# Patient Record
Sex: FEMALE | Race: OTHER
[De-identification: names, ages, dates, MRNs, and addresses within clinical notes are randomized per-mention and may not be internally consistent; named-entity substitution may affect disease eponyms.]

---

## 2017-03-11 NOTE — EDM.PDOC
ED HPI GI/ABDOMINAL





- General


Chief Complaint: Abdominal Pain


Stated Complaint: LOWER ABDOMINAL PAIN


Time Seen by Provider: 03/11/17 16:26


Source of Information: Reports: Patient, Old records


History Limitations: Reports: No limitations





- History of Present Illness


INITIAL COMMENTS - FREE TEXT/NARRATIVE: 





Patient presents for evaluation and treatment of lower abdominal, flank and low 

back pain. Patient's has been struggling with endometriosis since 2014. She has 

had a partial hysterectomy. She is scheduled to have an oophorectomy on Monday 

by Dr. Dixon in [place]. Patient reports since December her pain has been 

steadily worsening. She states that the pain began to worsen yesterday morning. 

She states that it starts in her back and is a sharp cramping sensation. She 

states that it goes around her flanks and into her lower abdomen. She reports 

associated symptoms of decreased appetite, nausea and chills. Patient reports a 

pressure in her lower abdomen. She denies any fevers, vomiting or dysuria. 

Patient states that she took some tramadol prior to arrival in the ER. 





She had a preop done yesterday in the clinic. She did not mention the pain to 

her PCP yesterday. 


Timing/Duration: Reports: Day(s): (2)


Quality: Reports: cramping


Treatments PTA: Reports: Other medication(s) (tramadol)





- Related Data


Allergies/ADRs: 


 Allergies











Allergy/AdvReac Type Severity Reaction Status Date / Time


 


codeine Allergy  Rash Verified 12/28/16 11:55


 


morphine Allergy  Hives Verified 12/28/16 11:55


 


ketorolac tromethamine AdvReac  Not good Verified 12/28/16 11:55





[From Toradol]   for her  





   kidneys  


 


lorazepam [From Ativan] AdvReac  Hallucinati Verified 12/31/16 11:24





   ons  


 


metoclopramide HCl AdvReac  Irritabilit Verified 12/28/16 11:55





[From Reglan]   y  











Home Meds: 


 Home Meds





Ondansetron [Zofran ODT] 4 mg PO Q8H PRN 12/28/16 [History]


Acetaminophen/oxyCODONE [Percocet 325-5 MG] 1 tab PO Q6H #10 tablet 03/11/17 [Rx

]


Promethazine [Phenergan] 25 mg PO Q6H #12 tablet 03/11/17 [Rx]


traMADol [Ultram] 200 mg PO BID PRN 03/11/17 [History]











Past Medical History





- Past Health History


Medical/Surgical History: Denies Medical/Surgical History


HEENT History: Reports: Impaired vision


Other HEENT History: glasses and contacts


Gastrointestinal History: Reports: Hemorrhoids, Other (see below)


Other Gastrointestinal History: Rectal fissures


Genitourinary History: Reports: Pyelonephritis, UTI, recurrent


Other Genitourinary History: endometreosis


OB/GYN History: Reports: Endometriosis, Pregnancy


Neurological History: Reports: Migraines


Hematologic History: Reports: Anemia


Oncologic (Cancer) History: Reports: Uterine


Dermatologic History: Reports: Eczema





- Past Surgical History


GI Surgical History: Reports: Other (see below)


Other GI Surgeries/Procedures: Rectal fissure surgery


Female  Surgical History: Reports: Hysterectomy


Other Female  Surgeries/Procedures: Studies done on kidneys, bladder, and 

ureter.   States has had partial hyst.





Social & Family History





- Family History


Family Medical History: Noncontributory





- Tobacco Use


Smoking Status *Q: Never Smoker


Years of Tobacco use: 5


Used Tobacco, but Quit: Yes


Month Tobacco Last Used: quit in 2012


Second Hand Smoke Exposure: No





- Caffeine Use


Caffeine Use: Reports: Coffee





- Alcohol Use


Days Per Week of Alcohol Use: 0





- Recreational Drug Use


Recreational Drug Use: No


Drug Use in Last 12 Months: No





- Living Situation & Occupation


Occupation: unemployed





ED ROS GENERAL





- Review of Systems


Review Of Systems: See Below


Constitutional: Reports: chills.  Denies: fever


GI/Abdominal: Reports: Abdominal pain (lower), Nausea.  Denies: Constipation, 

Diarrhea, Vomiting


: Reports: flank pain (bilateral)


Musculoskeletal: Reports: back pain (lower bilateral)





ED EXAM, GI/ABD





- Physical Exam


Exam: See Below


Exam Limited By: No limitations


General Appearance: alert, WD/WN, no apparent distress


Respiratory/Chest: no respiratory distress, lungs clear, normal breath sounds


Cardiovascular: normal peripheral pulses, regular rate, rhythm, no murmur


GI/Abdominal: normal bowel sounds, soft, tenderness (lower abdomen)


Back Exam: normal inspection.  No: CVA tenderness (L), CVA tenderness (R)


Neurological: alert, oriented, normal cognition


Psychiatric: normal affect, normal mood


Skin Exam: Warm, Dry, Normal color





Course





- Vital Signs


Last Recorded V/S: 


 Last Vital Signs











Temp  36.5 C   03/11/17 18:12


 


Pulse  75   03/11/17 18:12


 


Resp  14   03/11/17 18:12


 


BP  104/69   03/11/17 18:12


 


Pulse Ox  99   03/11/17 18:12














- Orders/Labs/Meds


Labs: 


 Laboratory Tests











  03/11/17 03/11/17 03/11/17 Range/Units





  16:24 16:24 16:30 


 


WBC  9.61    (3.98-10.04)  K/mm3


 


RBC  4.79    (3.98-5.22)  M/mm3


 


Hgb  14.6    (11.2-15.7)  gm/L


 


Hct  43.2    (34.1-44.9)  %


 


MCV  90.2    (79.4-94.8)  fl


 


MCH  30.5    (25.6-32.2)  pg


 


MCHC  33.8    (32.2-35.5)  g/dl


 


RDW Std Deviation  40.1    (36.4-46.3)  fL


 


Plt Count  319    (182-369)  K/mm3


 


MPV  10.4    (9.4-12.3)  fl


 


Neutrophils % (Manual)  56    (40-60)  %


 


Band Neutrophils %  1    (0-10)  %


 


Lymphocytes % (Manual)  24    (20-40)  %


 


Atypical Lymphs %  0    %


 


Monocytes % (Manual)  2    (2-10)  %


 


Eosinophils % (Manual)  14 H    (0.7-5.8)  %


 


Basophils % (Manual)  3 H    (0.1-1.2)  


 


Platelet Estimate  Adequate    


 


RBC Morph Comment  Normal    


 


Sodium   139   (136-145)  mEq/L


 


Potassium   4.0   (3.5-5.1)  mEq/L


 


Chloride   103   ()  mEq/L


 


Carbon Dioxide   26   (21-32)  mEq/L


 


Anion Gap   14.0   (5-15)  


 


BUN   12   (7-18)  mg/dL


 


Creatinine   0.8   (0.55-1.02)  mg/dL


 


Est Cr Clr Drug Dosing   86.60   mL/min


 


Estimated GFR (MDRD)   > 60   (>60)  mL/min


 


BUN/Creatinine Ratio   15.0   (14-18)  


 


Glucose   86   ()  mg/dL


 


Calcium   9.4   (8.5-10.1)  mg/dL


 


C-Reactive Protein   0.6   (<1.0)  mg/dL


 


Urine Color    Dark yellow  (Yellow)  


 


Urine Appearance    Cloudy H  (Clear)  


 


Urine pH    6.0  (5.0-8.0)  


 


Ur Specific Gravity    > or = 1.030  (1.005-1.030)  


 


Urine Protein    Negative  (Negative)  


 


Urine Glucose (UA)    Negative  (Negative)  


 


Urine Ketones    Negative  (Negative)  


 


Urine Occult Blood    2+ H  (Negative)  


 


Urine Nitrite    Negative  (Negative)  


 


Urine Bilirubin    Negative  (Negative)  


 


Urine Urobilinogen    0.2  (0.2-1.0)  


 


Ur Leukocyte Esterase    Negative  (Negative)  


 


Urine RBC    0-5  (0-5)  /hpf


 


Urine WBC    0-5  (0-5)  /hpf


 


Ur Epithelial Cells    0-5  (0-5)  /hpf


 


Urine Bacteria    Rare  (FEW)  /hpf


 


Urine Mucus    Few  (FEW)  /hpf











Meds: 


Medications














Discontinued Medications














Generic Name Dose Route Start Last Admin





  Trade Name Freq  PRN Reason Stop Dose Admin


 


Hydromorphone HCl  1 mg  03/11/17 16:49  03/11/17 17:01





  Dilaudid  IVPUSH  03/11/17 16:50  1 mg





  ONETIME ONE   Administration


 


Hydromorphone HCl  1 mg  03/11/17 17:42  03/11/17 17:47





  Dilaudid  IVPUSH  03/11/17 17:43  1 mg





  ONETIME ONE   Administration


 


Ondansetron HCl  4 mg  03/11/17 16:49  03/11/17 16:56





  Zofran  IVPUSH  03/11/17 16:50  4 mg





  ONETIME ONE   Administration


 


Sodium Chloride  10 ml  03/11/17 16:49  03/11/17 17:10





  Saline Flush  FLUSH   10 ml





  ASDIRECTED PRN   Administration





  Keep Vein Open   














- Re-Assessments/Exams


Free Text/Narrative Re-Assessment/Exam: 





03/11/17 17:49


Labs returned. 


White blood cell count is 9.61, hemoglobin is 14.6 and platelets are 319.


UA is 2+ blood, negative for nitrates and leukocytes.


CRPs within normal limits at 0.6.


Sodium is 139 contrast is 4.0 chloride is 13. Anion gap is 13.0.





Given her normal lab results I feel that imaging is unnecessary. Her pain is 

similar to endometriosis in the past. Her only concern today is that is more 

sharp than normal but she does have similar pain. She has also had 2+ blood in 

her urine in the past making kidney stones less likely. 





Patient reports pain continues to be a 9 out of 10. Will give an additional 1mg 

IV dilaudid. 





Will discharge home. I feel that her pain is most likely related to her 

endometriosis. 





Discharge instructions as documented. 











Departure





- Departure


Time of Disposition: 17:59


Disposition: Home, Self-Care 01


Condition: fair


Clinical Impression: 


 Endometriosis, Nausea





Prescriptions: 


Acetaminophen/oxyCODONE [Percocet 325-5 MG] 1 tab PO Q6H #10 tablet


Promethazine [Phenergan] 25 mg PO Q6H #12 tablet


Instructions:  Nausea, Adult, Endometriosis


Referrals: 


PCP,None [Ordering Only Provider] - 


Forms:  ED Department Discharge


Additional Instructions: 


You were given medication in the ER that can affect your ability to drive and 

operate machinery. No driving or operating machinery within 12 hours of taking 

prescription narcotic pain medication.





Continue with your current plan of care.





Percocet one tab every 4-6 hours as needed for severe pain. No driving 

operating machinery within 12 hours of taking prescription narcotic pain 

medication. I recommend take as few of the Percocet as needed control your pain 

as percocet can be habit forming.





Phenergan one tab every 6 hours as needed for nausea.





Please return to the ER should your symptoms change or worsen.

## 2017-10-23 NOTE — CT
CT abdomen and pelvis

 

Technique: Multiple axial sections were obtained from the top of the 

liver inferiorly through the pubic symphysis.  Intravenous and oral 

contrast was not utilized.  Study has been performed as a ureteral 

stone protocol.

 

Comparison: Previous CT abdomen and pelvis exam of 12/30/16 is 

available.

 

Findings: Mild right sided hydronephrosis is seen.  There is a faint 

calcification being seen projected within the distal right ureter at 

the UVJ measuring 2 mm or less presumably causing the right-sided 

hydronephrosis and due to a very minimal obstructing ureteral stone.  

No other abnormal calcifications are seen within the kidneys or along 

the course of the ureters.

 

Small portion of the visualized lung bases appear clear.  Visualized 

liver shows a normal noncontrast CT appearance.  Spleen appears within

 normal limits.  Adrenal glands show no nodule.  No discrete 

abnormality appreciated within the pancreas.  Gallbladder shows no 

calcified gallstones.  Aorta shows no aneurysmal dilatation.  No 

retroperitoneal adenopathy or mesenteric abnormalities are seen.  

Appendix is seen which is normal.  No pelvic mass or adenopathy is 

identified.  No free fluid is seen.  No inflammatory change is noted.

 

Bone window settings were reviewed which appear within normal limits 

for the patient's age.

 

Impression:

1.  Very faint 2 mm or smaller calcification projected within the 

distal right ureter at the UVJ.  This is felt to be a very small 

obstructing stone causing mild right sided hydronephrosis.

2.  Other normal findings as described above seen on noncontrast study

 performed as a ureteral stone protocol.

 

Diagnostic code #3

 

Agree with preliminary report issued by [employer] ([facility] 

preliminary report dictated on 10/23/17, 1:11 AM Central Time)

## 2017-10-23 NOTE — EDM.PDOC
ED HPI GENERAL MEDICAL PROBLEM





- General


Chief Complaint: Flank Pain


Stated Complaint: TROUBLE URINATING


Time Seen by Provider: 10/22/17 23:12


Source of Information: Reports: Patient


History Limitations: Reports: No Limitations





- History of Present Illness


INITIAL COMMENTS - FREE TEXT/NARRATIVE: 





The patient presents with bilateral flank pain with the right being worse then 

the left.  She has nausea but no vomiting.  She has no fever or chills.  She is 

having trouble urinating.  It is hard to go.  She dose not have any dysuria or 

hematuria.  She has a history of endometriosis and hystorectomy.  She also has 

a history of kidney stones.


Onset: Gradual


Duration: Day(s):


Location: Reports: Abdomen, Back


Quality: Reports: Sharp


Severity: Severe


Improves with: Reports: None


Worsens with: Reports: None


Associated Symptoms: Reports: Nausea/Vomiting.  Denies: Chest Pain, Cough, Fever

/Chills, Shortness of Breath


  ** Bilateral Flank


Pain Score (Numeric/FACES): 8





- Related Data


 Allergies











Allergy/AdvReac Type Severity Reaction Status Date / Time


 


codeine Allergy  Rash Verified 08/29/17 18:57


 


morphine Allergy  Hives Verified 08/29/17 18:57


 


ketorolac tromethamine AdvReac  Not good Verified 08/29/17 18:57





[From Toradol]   for her  





   kidneys  


 


lorazepam [From Ativan] AdvReac  Hallucinati Verified 08/29/17 18:57





   ons  


 


metoclopramide HCl AdvReac  Irritabilit Verified 08/29/17 18:57





[From Reglan]   y  











Home Meds: 


 Home Meds





Tamsulosin HCl [Flomax] 0.4 mg PO DAILY #7 cap.er.24h 10/23/17 [Rx]


oxyCODONE HCl/Acetaminophen [Percocet 5-325 mg Tablet] 1 - 2 each PO Q6HR PRN #

20 tablet 10/23/17 [Rx]











Past Medical History





- Past Health History


Medical/Surgical History: Denies Medical/Surgical History


HEENT History: Reports: Impaired Vision


Other HEENT History: glasses and contacts


Gastrointestinal History: Reports: Hemorrhoids


Other Gastrointestinal History: Rectal fissures


Genitourinary History: Reports: Pyelonephritis, UTI, Recurrent


Other Genitourinary History: endometreosis, kidney stones


OB/GYN History: Reports: Endometriosis, Pregnancy


Neurological History: Reports: Migraines


Psychiatric History: Reports: Anxiety


Hematologic History: Reports: Anemia


Oncologic (Cancer) History: Reports: Uterine


Dermatologic History: Reports: Eczema





- Past Surgical History


GI Surgical History: Reports: Other (See Below)


Female  Surgical History: Reports: Kidney stone extraction, Ureteral Stent





Social & Family History





- Family History


Family Medical History: Noncontributory





- Tobacco Use


Smoking Status *Q: Former Smoker


Years of Tobacco use: 5


Used Tobacco, but Quit: Yes


Month Tobacco Last Used: 2010


Second Hand Smoke Exposure: No





- Caffeine Use


Caffeine Use: Reports: None





- Alcohol Use


Days Per Week of Alcohol Use: 0





- Recreational Drug Use


Recreational Drug Use: No


Drug Use in Last 12 Months: No





- Living Situation & Occupation


Occupation: Unemployed





ED ROS GENERAL





- Review of Systems


Review Of Systems: See Below


Constitutional: Reports: No Symptoms


HEENT: Reports: No Symptoms


Respiratory: Reports: No Symptoms


Cardiovascular: Reports: No Symptoms


Endocrine: Reports: No Symptoms


GI/Abdominal: Reports: Abdominal Pain, Nausea.  Denies: Vomiting


: Reports: Other (Having trouble urinating)


Musculoskeletal: Reports: Back Pain





ED EXAM, GI/ABD





- Physical Exam


Exam: See Below


Exam Limited By: No Limitations


General Appearance: Alert, No Apparent Distress


Ears: Normal External Exam


Nose: Normal Inspection


Head: Atraumatic, Normocephalic


Neck: Normal Inspection


Respiratory/Chest: No Respiratory Distress, Lungs Clear, Normal Breath Sounds


Cardiovascular: Regular Rate, Rhythm, No Edema, No Murmur


GI/Abdominal Exam: Soft, Non-Tender, No Organomegaly, No Mass


Back Exam: CVA Tenderness (L), CVA Tenderness (R)


Extremities: Normal Inspection





Course





- Vital Signs


Last Recorded V/S: 





 Last Vital Signs











Temp  98.8 F   10/22/17 22:46


 


Pulse  87   10/22/17 22:46


 


Resp  20   10/22/17 22:46


 


BP  127/98 H  10/22/17 22:46


 


Pulse Ox  97   10/22/17 22:46














- Orders/Labs/Meds


Orders: 





 Active Orders 24 hr











 Category Date Time Status


 


 Peripheral IV Care [RC] .AS DIRECTED Care  10/22/17 23:23 Active


 


 Abdomen Pelvis wo Cont [CT] Stat Exams  10/22/17 23:22 Taken


 


 Sodium Chloride 0.9% [Normal Saline] 1,000 ml Med  10/22/17 23:30 Active





 IV ASDIRECTED   


 


 Sodium Chloride 0.9% [Saline Flush] Med  10/22/17 23:22 Active





 10 ml FLUSH ASDIRECTED PRN   


 


 ED Antiemetic Medication Reflex [OM.PC] Stat Oth  10/22/17 23:22 Ordered


 


 Peripheral IV Insertion Adult [OM.PC] Stat Oth  10/22/17 23:22 Ordered








 Medication Orders





Sodium Chloride (Normal Saline)  1,000 mls @ 125 mls/hr IV ASDIRECTED BRIA


   Last Admin: 10/22/17 23:40  Dose: 125 mls/hr


Sodium Chloride (Saline Flush)  10 ml FLUSH ASDIRECTED PRN


   PRN Reason: Keep Vein Open


   Last Admin: 10/22/17 23:53  Dose: 10 ml








Labs: 





 Laboratory Tests











  10/22/17 10/23/17 10/23/17 Range/Units





  23:37 00:52 00:52 


 


WBC   9.19   (3.98-10.04)  K/mm3


 


RBC   4.82   (3.98-5.22)  M/mm3


 


Hgb   14.9   (11.2-15.7)  gm/L


 


Hct   43.7   (34.1-44.9)  %


 


MCV   90.7   (79.4-94.8)  fl


 


MCH   30.9   (25.6-32.2)  pg


 


MCHC   34.1   (32.2-35.5)  g/dl


 


RDW Std Deviation   40.3   (36.4-46.3)  fL


 


Plt Count   289   (182-369)  K/mm3


 


MPV   10.2   (9.4-12.3)  fl


 


Neut % (Auto)   59.7   (34.0-71.1)  %


 


Lymph % (Auto)   30.1   (19.3-51.7)  %


 


Mono % (Auto)   8.2   (4.7-12.5)  %


 


Eos % (Auto)   1.6   (0.7-5.8)  


 


Baso % (Auto)   0.2   (0.1-1.2)  %


 


Neut # (Auto)   5.48   (1.56-6.13)  K/mm3


 


Lymph # (Auto)   2.77   (1.18-3.74)  K/mm3


 


Mono # (Auto)   0.75 H   (0.24-0.36)  K/mm3


 


Eos # (Auto)   0.15   (0.04-0.36)  K/mm3


 


Baso # (Auto)   0.02   (0.01-0.08)  K/mm3


 


Sodium    140  (136-145)  mEq/L


 


Potassium    3.6  (3.5-5.1)  mEq/L


 


Chloride    104  ()  mEq/L


 


Carbon Dioxide    24  (21-32)  mEq/L


 


Anion Gap    15.6 H  (5-15)  


 


BUN    15  (7-18)  mg/dL


 


Creatinine    0.8  (0.55-1.02)  mg/dL


 


Est Cr Clr Drug Dosing    85.83  mL/min


 


Estimated GFR (MDRD)    > 60  (>60)  mL/min


 


BUN/Creatinine Ratio    18.8 H  (14-18)  


 


Glucose    87  ()  mg/dL


 


Calcium    9.9  (8.5-10.1)  mg/dL


 


Total Bilirubin    0.3  (0.2-1.0)  mg/dL


 


AST    19  (15-37)  U/L


 


ALT    22  (14-59)  U/L


 


Alkaline Phosphatase    70  ()  U/L


 


Total Protein    8.4 H  (6.4-8.2)  g/dl


 


Albumin    4.2  (3.4-5.0)  g/dl


 


Globulin    4.2  gm/dL


 


Albumin/Globulin Ratio    1.0  (1-2)  


 


Lipase    172  ()  U/L


 


Urine Color  Yellow    (Yellow)  


 


Urine Appearance  Slt cloudy H    (Clear)  


 


Urine pH  7.0    (5.0-8.0)  


 


Ur Specific Gravity  1.020    (1.005-1.030)  


 


Urine Protein  Negative    (Negative)  


 


Urine Glucose (UA)  Negative    (Negative)  


 


Urine Ketones  Negative    (Negative)  


 


Urine Occult Blood  Negative    (Negative)  


 


Urine Nitrite  Negative    (Negative)  


 


Urine Bilirubin  Negative    (Negative)  


 


Urine Urobilinogen  0.2    (0.2-1.0)  


 


Ur Leukocyte Esterase  Negative    (Negative)  


 


Urine RBC  0-5    (0-5)  /hpf


 


Urine WBC  0-5    (0-5)  /hpf


 


Ur Epithelial Cells  10-20 H    (0-5)  /hpf


 


Urine Bacteria  Few    (FEW)  /hpf


 


Urine Mucus  Few    (FEW)  /hpf











Meds: 





Medications











Generic Name Dose Route Start Last Admin





  Trade Name Freq  PRN Reason Stop Dose Admin


 


Sodium Chloride  1,000 mls @ 125 mls/hr  10/22/17 23:30  10/22/17 23:40





  Normal Saline  IV   125 mls/hr





  ASDIRECTED BRIA   Administration


 


Sodium Chloride  10 ml  10/22/17 23:22  10/22/17 23:53





  Saline Flush  FLUSH   10 ml





  ASDIRECTED PRN   Administration





  Keep Vein Open   














Discontinued Medications














Generic Name Dose Route Start Last Admin





  Trade Name Toddq  PRN Reason Stop Dose Admin


 


Diphenhydramine HCl  50 mg  10/23/17 00:25  10/23/17 00:29





  Benadryl  IVPUSH  10/23/17 00:26  50 mg





  ONETIME ONE   Administration


 


Diphenhydramine HCl  25 mg  10/23/17 01:15  





  Benadryl  IVPUSH  10/23/17 01:16  





  ONETIME ONE   


 


Hydromorphone HCl  1 mg  10/22/17 23:23  10/22/17 23:41





  Dilaudid  IVPUSH  10/22/17 23:24  1 mg





  ONETIME ONE   Administration


 


Hydromorphone HCl  1 mg  10/23/17 01:15  





  Dilaudid  IVPUSH  10/23/17 01:16  





  ONETIME ONE   


 


Ondansetron HCl  4 mg  10/22/17 23:22  10/22/17 23:41





  Zofran  IVPUSH  10/22/17 23:23  4 mg





  ONETIME ONE   Administration


 


Ondansetron HCl  4 mg  10/23/17 01:15  





  Zofran  IVPUSH  10/23/17 01:16  





  ONETIME ONE   














- Re-Assessments/Exams


Free Text/Narrative Re-Assessment/Exam: 





10/23/17 01:24


I ordered an IV NS at 125mL/hr, zofran 4mg IV, dilaudid 1mg IV, labs, UA and a 

CT of her abdomen and pelvis.  Her CBC and CMP look good.  Her UA show no UTI.  

Her CT shows mild right sided hydronephrosis.  This could be from a recently 

passed stone or potentially there is a very faintly calcified 2mm right UVJ 

sone present.  She had some itching with the zofran so I ordered benadryl.  She 

is having more and nausea so I ordered dilaudid, zofran and benadryl.  She has 

a kidney stone so I will give her prescriptions for percocet and flomax.





Departure





- Departure


Time of Disposition: 01:35


Disposition: Home, Self-Care 01


Condition: Good


Clinical Impression: 


 Kidney stone, Ureteral calculus, right








- Discharge Information


Prescriptions: 


oxyCODONE HCl/Acetaminophen [Percocet 5-325 mg Tablet] 1 - 2 each PO Q6HR PRN #

20 tablet


 PRN Reason: Pain


Tamsulosin HCl [Flomax] 0.4 mg PO DAILY #7 cap.er.24h


Referrals: 


Donavon De León [Primary Care Provider] - 1 Week


Additional Instructions: 


Take the percocet for pain and take flomax daily.  Drink plenty of fluids.  

Follow up with your doctor and please return if you are worse.





- My Orders


Last 24 Hours: 





My Active Orders





10/22/17 23:22


Abdomen Pelvis wo Cont [CT] Stat 


Sodium Chloride 0.9% [Saline Flush]   10 ml FLUSH ASDIRECTED PRN 


ED Antiemetic Medication Reflex [OM.PC] Stat 


Peripheral IV Insertion Adult [OM.PC] Stat 





10/22/17 23:23


Peripheral IV Care [RC] .AS DIRECTED 





10/22/17 23:30


Sodium Chloride 0.9% [Normal Saline] 1,000 ml IV ASDIRECTED 














- Assessment/Plan


Last 24 Hours: 





My Active Orders





10/22/17 23:22


Abdomen Pelvis wo Cont [CT] Stat 


Sodium Chloride 0.9% [Saline Flush]   10 ml FLUSH ASDIRECTED PRN 


ED Antiemetic Medication Reflex [OM.PC] Stat 


Peripheral IV Insertion Adult [OM.PC] Stat 





10/22/17 23:23


Peripheral IV Care [RC] .AS DIRECTED 





10/22/17 23:30


Sodium Chloride 0.9% [Normal Saline] 1,000 ml IV ASDIRECTED

## 2017-10-28 NOTE — EDM.PDOC
ED HPI GENERAL MEDICAL PROBLEM





- General


Chief Complaint: Flank Pain


Stated Complaint: RIGHT FLANK PAIN


Time Seen by Provider: 10/27/17 23:55


Source of Information: Reports: Patient


History Limitations: Reports: No Limitations





- History of Present Illness


INITIAL COMMENTS - FREE TEXT/NARRATIVE: 





This is a 27-year-old  female. She was here on Sunday was diagnosed 

with a 2 mm stone in the distal right ureter with some mild hydronephrosis. She 

states she's been straining her urine since that time has not followed up with 

her family doctor and then today apparently the right flank and lower quadrant 

abdominal pain seemed to get worse. Now she says is very sharp pain and she is 

nauseated and she comes back to the ER for evaluation. She's had no actual 

vomiting at this time. She states today she noted a couple of drops of blood in 

her urine when she urinated. She has not passed the stone onto the strainer as 

of yet. No fever no chills. No other acute symptoms.


  ** Right Flank


Pain Score (Numeric/FACES): 9





- Related Data


 Allergies











Allergy/AdvReac Type Severity Reaction Status Date / Time


 


codeine Allergy  Rash Verified 10/28/17 00:08


 


morphine Allergy  Hives Verified 10/28/17 00:08


 


ketorolac tromethamine AdvReac  Not good Verified 10/28/17 00:08





[From Toradol]   for her  





   kidneys  


 


lorazepam [From Ativan] AdvReac  Hallucinati Verified 10/28/17 00:08





   ons  


 


metoclopramide HCl AdvReac  Irritabilit Verified 10/28/17 00:08





[From Reglan]   y  











Home Meds: 


 Home Meds





Tamsulosin HCl [Flomax] 0.4 mg PO DAILY #7 cap.er.24h 10/23/17 [Rx]


oxyCODONE HCl/Acetaminophen [Percocet 5-325 mg Tablet] 1 - 2 each PO Q6HR PRN #

20 tablet 10/23/17 [Rx]


Promethazine [Phenergan] 25 mg PO Q6H PRN #12 tablet 10/28/17 [Rx]


traMADol [Ultram] 50 mg PO Q8H PRN #12 tablet 10/28/17 [Rx]











Past Medical History





- Past Health History


Medical/Surgical History: Denies Medical/Surgical History


HEENT History: Reports: Impaired Vision


Other HEENT History: glasses and contacts


Gastrointestinal History: Reports: Hemorrhoids


Other Gastrointestinal History: Rectal fissures


Genitourinary History: Reports: Pyelonephritis, UTI, Recurrent


Other Genitourinary History: endometreosis, kidney stones


OB/GYN History: Reports: Endometriosis, Pregnancy


Neurological History: Reports: Migraines


Psychiatric History: Reports: Anxiety


Hematologic History: Reports: Anemia


Oncologic (Cancer) History: Reports: Uterine


Dermatologic History: Reports: Eczema





- Past Surgical History


GI Surgical History: Reports: Other (See Below)


Female  Surgical History: Reports: Kidney stone extraction, Ureteral Stent





Social & Family History





- Family History


Family Medical History: Noncontributory





- Tobacco Use


Smoking Status *Q: Former Smoker


Years of Tobacco use: 5


Used Tobacco, but Quit: Yes


Month Tobacco Last Used: 2


Second Hand Smoke Exposure: Yes





- Caffeine Use


Caffeine Use: Reports: None





- Alcohol Use


Days Per Week of Alcohol Use: 0





- Recreational Drug Use


Recreational Drug Use: No


Drug Use in Last 12 Months: No





- Living Situation & Occupation


Occupation: Unemployed





ED ROS GENERAL





- Review of Systems


Review Of Systems: See Below


Constitutional: Denies: Fever, Chills


HEENT: Reports: No Symptoms


Respiratory: Reports: No Symptoms


Cardiovascular: Reports: No Symptoms


Endocrine: Reports: No Symptoms


GI/Abdominal: Reports: Abdominal Pain, Nausea.  Denies: Vomiting


: Reports: Flank Pain, Hematuria.  Denies: Discharge


Musculoskeletal: Reports: No Symptoms


Skin: Reports: No Symptoms


Neurological: Reports: No Symptoms


Psychiatric: Reports: No Symptoms


Hematologic/Lymphatic: Reports: No Symptoms





ED EXAM, GI/ABD





- Physical Exam


Exam: See Below


Exam Limited By: No Limitations


General Appearance: Alert, WD/WN, No Apparent Distress


Eyes: Bilateral: Normal Appearance


Ears: Normal External Exam


Nose: Normal Inspection


Throat/Mouth: Normal Inspection, Normal Lips, Normal Voice, No Airway Compromise


Head: Normocephalic


Neck: Supple


Respiratory/Chest: No Respiratory Distress, Lungs Clear, Normal Breath Sounds


Cardiovascular: Regular Rate, Rhythm, No Murmur


GI/Abdominal Exam: Soft, Other (Mild soreness in the right lower quadrant and 

possibly the flank but no rigidity no masses, no rebound noted)


Back Exam: Full Range of Motion


Extremities: Normal Inspection, Normal Range of Motion, Non-Tender


Neurological: Alert, Oriented


Psychiatric: Normal Affect, Normal Mood


Skin Exam: Warm, Dry





Course





- Vital Signs


Last Recorded V/S: 


 Last Vital Signs











Temp  98.3 F   10/27/17 23:52


 


Pulse  110 H  10/27/17 23:52


 


Resp  18   10/27/17 23:52


 


BP  143/93 H  10/27/17 23:52


 


Pulse Ox  99   10/27/17 23:52














- Orders/Labs/Meds


Orders: 


 Active Orders 24 hr











 Category Date Time Status


 


 Sodium Chloride 0.9% [Normal Saline] 1,000 ml Med  10/28/17 00:15 Active





 IV ASDIRECTED   








 Medication Orders





Sodium Chloride (Normal Saline)  1,000 mls @ 1,000 mls/hr IV ASDIRECTED BRIA


   Last Admin: 10/28/17 00:59  Dose: 1,000 mls/hr








Labs: 


 Laboratory Tests











  10/28/17 10/28/17 10/28/17 Range/Units





  00:44 00:56 00:56 


 


WBC   7.74   (3.98-10.04)  K/mm3


 


RBC   4.57   (3.98-5.22)  M/mm3


 


Hgb   14.2   (11.2-15.7)  gm/L


 


Hct   41.2   (34.1-44.9)  %


 


MCV   90.2   (79.4-94.8)  fl


 


MCH   31.1   (25.6-32.2)  pg


 


MCHC   34.5   (32.2-35.5)  g/dl


 


RDW Std Deviation   39.7   (36.4-46.3)  fL


 


Plt Count   288   (182-369)  K/mm3


 


MPV   10.1   (9.4-12.3)  fl


 


Neut % (Auto)   59.1   (34.0-71.1)  %


 


Lymph % (Auto)   29.8   (19.3-51.7)  %


 


Mono % (Auto)   8.1   (4.7-12.5)  %


 


Eos % (Auto)   2.5   (0.7-5.8)  


 


Baso % (Auto)   0.4   (0.1-1.2)  %


 


Neut # (Auto)   4.57   (1.56-6.13)  K/mm3


 


Lymph # (Auto)   2.31   (1.18-3.74)  K/mm3


 


Mono # (Auto)   0.63 H   (0.24-0.36)  K/mm3


 


Eos # (Auto)   0.19   (0.04-0.36)  K/mm3


 


Baso # (Auto)   0.03   (0.01-0.08)  K/mm3


 


Sodium    142  (136-145)  mEq/L


 


Potassium    3.6  (3.5-5.1)  mEq/L


 


Chloride    105  ()  mEq/L


 


Carbon Dioxide    26  (21-32)  mEq/L


 


Anion Gap    14.6  (5-15)  


 


BUN    13  (7-18)  mg/dL


 


Creatinine    0.8  (0.55-1.02)  mg/dL


 


Est Cr Clr Drug Dosing    85.83  mL/min


 


Estimated GFR (MDRD)    > 60  (>60)  mL/min


 


BUN/Creatinine Ratio    16.3  (14-18)  


 


Glucose    100  ()  mg/dL


 


Calcium    9.3  (8.5-10.1)  mg/dL


 


Total Bilirubin    0.3  (0.2-1.0)  mg/dL


 


AST    17  (15-37)  U/L


 


ALT    23  (14-59)  U/L


 


Alkaline Phosphatase    66  ()  U/L


 


Total Protein    8.0  (6.4-8.2)  g/dl


 


Albumin    4.1  (3.4-5.0)  g/dl


 


Globulin    3.9  gm/dL


 


Albumin/Globulin Ratio    1.1  (1-2)  


 


Urine Color  Light yellow    (Yellow)  


 


Urine Appearance  Clear    (Clear)  


 


Urine pH  7.0    (5.0-8.0)  


 


Ur Specific Gravity  1.015    (1.005-1.030)  


 


Urine Protein  Negative    (Negative)  


 


Urine Glucose (UA)  Negative    (Negative)  


 


Urine Ketones  Negative    (Negative)  


 


Urine Occult Blood  Negative    (Negative)  


 


Urine Nitrite  Negative    (Negative)  


 


Urine Bilirubin  Negative    (Negative)  


 


Urine Urobilinogen  0.2    (0.2-1.0)  


 


Ur Leukocyte Esterase  Negative    (Negative)  


 


Urine RBC  0-5    (0-5)  /hpf


 


Urine WBC  0-5    (0-5)  /hpf


 


Ur Epithelial Cells  0-5    (0-5)  /hpf


 


Urine Bacteria  Rare    (FEW)  /hpf


 


Urine Mucus  Not seen    (FEW)  /hpf











Meds: 


Medications











Generic Name Dose Route Start Last Admin





  Trade Name Freq  PRN Reason Stop Dose Admin


 


Sodium Chloride  1,000 mls @ 1,000 mls/hr  10/28/17 00:15  10/28/17 00:59





  Normal Saline  IV   1,000 mls/hr





  ASDIRECTED BRIA   Administration














Discontinued Medications














Generic Name Dose Route Start Last Admin





  Trade Name Freq  PRN Reason Stop Dose Admin


 


Diphenhydramine HCl  25 mg  10/28/17 00:13  10/28/17 01:04





  Benadryl  IVPUSH  10/28/17 00:14  25 mg





  ONETIME ONE   Administration


 


Ondansetron HCl  4 mg  10/28/17 00:14  10/28/17 01:03





  Zofran  IVPUSH  10/28/17 00:15  4 mg





  ONETIME ONE   Administration


 


Tramadol HCl  50 mg  10/28/17 02:00  10/28/17 02:06





  Ultram  PO  10/28/17 02:01  50 mg





  ONETIME ONE   Administration














- Re-Assessments/Exams


Free Text/Narrative Re-Assessment/Exam: 





10/28/17 02:11


I spoke to the patient at length regarding her kidney stone. I indicated to her 

that tramadol had worked well in the past for me and she says that works well 

for her. I indicated that if she needed other pain medication she would have to 

get it from Dr. Lopez her primary care physician. She did not appreciate me or 

the nurse because she felt like we were talking down to her about her pain 

medication requests. She had talked to the nurse at least 3 times for pain 

medications but never actually talk to me for pain medications. I explained to 

her know this was not the case and I have passed a kidney stone and I realize 

that they can hurt but she is never expressed any significant need for pain 

medication since she's been here had any exterior signs that she was in severe 

pain needing IV pain medications. She is happy with the tramadol since she has 

taken it in the past and it is worked for her.





Departure





- Departure


Time of Disposition: 02:14


Disposition: Home, Self-Care 01


Clinical Impression: 


 Ureteral stone, Ureteral colic, Nausea








- Discharge Information


Prescriptions: 


Promethazine [Phenergan] 25 mg PO Q6H PRN #12 tablet


 PRN Reason: Nausea


traMADol [Ultram] 50 mg PO Q8H PRN #12 tablet


 PRN Reason: Pain


Instructions:  Kidney Stones, Easy-to-Read


Referrals: 


Donavon De León [Primary Care Provider] - 


Forms:  ED Department Discharge


Additional Instructions: 


Home and drink lots of fluids, continue to strain your urine for the kidney 

stone, take the medicine as needed for nausea and pain, you need to follow-up 

with Dr. Lopez so that if this continues he can refer you to her urologist who 

can remedy this situation if the stone is unwilling to move





- My Orders


Last 24 Hours: 


My Active Orders





10/28/17 00:15


Sodium Chloride 0.9% [Normal Saline] 1,000 ml IV ASDIRECTED 














- Assessment/Plan


Last 24 Hours: 


My Active Orders





10/28/17 00:15


Sodium Chloride 0.9% [Normal Saline] 1,000 ml IV ASDIRECTED

## 2017-11-29 NOTE — EDM.PDOC
ED HPI GENERAL MEDICAL PROBLEM





- General


Chief Complaint: OB/GYN Problem


Stated Complaint: vaginal pain


Time Seen by Provider: 11/29/17 21:26





- History of Present Illness


INITIAL COMMENTS - FREE TEXT/NARRATIVE: 





27-year-old female presents emergency room with pelvic pain.





Patient denies any increased discharge no burning or frequency with urination 

occasionally she has some discomfort with urination. Patient has long history 

of endometriosis. She has follow-up with her gynecologist in [place] on 20 December and the way she describes it they're anticipating oophorectomy she's 

already had a hysterectomy. She's had some nausea this is been worsened by her 

using her tramadol.. Her pain is worse left pelvis irritating the left side of 

the vagina. She has not had any increased discharge.


  ** Lower Pelvic


Pain Score (Numeric/FACES): 9





- Related Data


 Allergies











Allergy/AdvReac Type Severity Reaction Status Date / Time


 


codeine Allergy  Rash Verified 10/28/17 00:08


 


morphine Allergy  Hives Verified 10/28/17 00:08


 


ketorolac tromethamine AdvReac  Not good Verified 10/28/17 00:08





[From Toradol]   for her  





   kidneys  


 


lorazepam [From Ativan] AdvReac  Hallucinati Verified 10/28/17 00:08





   ons  


 


metoclopramide HCl AdvReac  Irritabilit Verified 10/28/17 00:08





[From Reglan]   y  











Home Meds: 


 Home Meds





traMADol [Ultram] 50 mg PO Q8H PRN #12 tablet 10/28/17 [Rx]


ALPRAZolam [Alprazolam] 0.5 mg PO BEDTIME PRN 11/29/17 [History]


ALPRAZolam [Alprazolam] 0.5 mg PO BEDTIME PRN 11/29/17 [History]


metroNIDAZOLE [Flagyl] 500 mg PO Q12H #14 tablet 11/30/17 [Rx]











Past Medical History





- Past Health History


Medical/Surgical History: Denies Medical/Surgical History


HEENT History: Reports: Impaired Vision


Other HEENT History: glasses and contacts


Gastrointestinal History: Reports: Hemorrhoids


Other Gastrointestinal History: Rectal fissures


Genitourinary History: Reports: Pyelonephritis, UTI, Recurrent


Other Genitourinary History: endometreosis, kidney stones


OB/GYN History: Reports: Endometriosis, Pregnancy


Neurological History: Reports: Migraines


Psychiatric History: Reports: Anxiety


Hematologic History: Reports: Anemia


Oncologic (Cancer) History: Reports: Uterine


Dermatologic History: Reports: Eczema





- Past Surgical History


Female  Surgical History: Reports: Hysterectomy, Kidney stone extraction, 

Ureteral Stent





Social & Family History





- Family History


Family Medical History: Noncontributory





- Tobacco Use


Smoking Status *Q: Never Smoker


Years of Tobacco use: 5


Used Tobacco, but Quit: Yes


Month Tobacco Last Used: 2


Second Hand Smoke Exposure: Yes





- Caffeine Use


Caffeine Use: Reports: None





- Alcohol Use


Days Per Week of Alcohol Use: 0





- Recreational Drug Use


Recreational Drug Use: No


Drug Use in Last 12 Months: No





- Living Situation & Occupation


Occupation: Unemployed





ED ROS GENERAL





- Review of Systems


Review Of Systems: See Below


Constitutional: Reports: Chills.  Denies: Fever, Night Sweats, Diaphoresis


HEENT: Reports: No Symptoms


Respiratory: Reports: No Symptoms


Cardiovascular: Reports: No Symptoms


GI/Abdominal: Reports: Nausea.  Denies: Constipation, Diarrhea, Vomiting


: Reports: Discharge (More discharge than normal but not an abnormal discharge

)


Musculoskeletal: Reports: No Symptoms


Skin: Reports: No Symptoms


Neurological: Reports: No Symptoms


Psychiatric: Reports: No Symptoms





ED EXAM, GENERAL





- Physical Exam


Exam: See Below


Exam Limited By: No Limitations


General Appearance: Alert, No Apparent Distress


Head: Atraumatic, Normocephalic


Neck: Normal Inspection, Supple, Non-Tender, Full Range of Motion.  No: 

Lymphadenopathy (R)


Respiratory/Chest: No Respiratory Distress, Lungs Clear, Normal Breath Sounds


Cardiovascular: Regular Rate, Rhythm, No Edema, No Murmur


GI/Abdominal: Normal Bowel Sounds, Soft, Other (She has some left suprapubic 

discomfort more so than the right she has right sided suprapubic discomfort no 

distention guarding rigidity or rebound noted)


 (Female) Exam: Normal External Exam, Normal Speculum Exam, Other (Cervix 

surgically absent bimanual examination reveals tenderness in the pelvis more so 

than the right)


Back Exam: Normal Inspection.  No: CVA Tenderness (L), CVA Tenderness (R)


Extremities: Normal Inspection, No Pedal Edema


Neurological: Alert, Oriented, Normal Cognition


Psychiatric: Normal Affect





Course





- Vital Signs


Last Recorded V/S: 


 Last Vital Signs











Temp  36.8 C   11/29/17 21:01


 


Pulse  96   11/29/17 21:01


 


Resp  16   11/29/17 21:01


 


BP  121/90   11/29/17 21:01


 


Pulse Ox  96   11/29/17 21:01














- Orders/Labs/Meds


Labs: 


 Laboratory Tests











  11/29/17 11/29/17 11/29/17 Range/Units





  21:55 21:55 22:02 


 


WBC  8.47    (3.98-10.04)  K/mm3


 


RBC  5.07    (3.98-5.22)  M/mm3


 


Hgb  15.7    (11.2-15.7)  gm/L


 


Hct  45.3 H    (34.1-44.9)  %


 


MCV  89.3    (79.4-94.8)  fl


 


MCH  31.0    (25.6-32.2)  pg


 


MCHC  34.7    (32.2-35.5)  g/dl


 


RDW Std Deviation  39.2    (36.4-46.3)  fL


 


Plt Count  249    (182-369)  K/mm3


 


MPV  10.5    (9.4-12.3)  fl


 


Neutrophils % (Manual)  66 H    (40-60)  %


 


Band Neutrophils %  0    (0-10)  %


 


Lymphocytes % (Manual)  23    (20-40)  %


 


Atypical Lymphs %  0    %


 


Monocytes % (Manual)  6    (2-10)  %


 


Eosinophils % (Manual)  5    (0.7-5.8)  %


 


Basophils % (Manual)  0 L    (0.1-1.2)  


 


Platelet Estimate  Adequate    


 


Plt Morphology Comment  Normal    


 


RBC Morph Comment  Normal    


 


Sodium   141   (136-145)  mEq/L


 


Potassium   3.7   (3.5-5.1)  mEq/L


 


Chloride   104   ()  mEq/L


 


Carbon Dioxide   26   (21-32)  mEq/L


 


Anion Gap   14.7   (5-15)  


 


BUN   10   (7-18)  mg/dL


 


Creatinine   0.8   (0.55-1.02)  mg/dL


 


Est Cr Clr Drug Dosing   89.60   mL/min


 


Estimated GFR (MDRD)   > 60   (>60)  mL/min


 


BUN/Creatinine Ratio   12.5 L   (14-18)  


 


Glucose   85   ()  mg/dL


 


Calcium   9.6   (8.5-10.1)  mg/dL


 


Total Bilirubin   0.3   (0.2-1.0)  mg/dL


 


AST   16   (15-37)  U/L


 


ALT   21   (14-59)  U/L


 


Alkaline Phosphatase   74   ()  U/L


 


Total Protein   8.0   (6.4-8.2)  g/dl


 


Albumin   4.0   (3.4-5.0)  g/dl


 


Globulin   4.0   gm/dL


 


Albumin/Globulin Ratio   1.0   (1-2)  


 


Urine Color     (Yellow)  


 


Urine Appearance     (Clear)  


 


Urine pH     (5.0-8.0)  


 


Ur Specific Gravity     (1.005-1.030)  


 


Urine Protein     (Negative)  


 


Urine Glucose (UA)     (Negative)  


 


Urine Ketones     (Negative)  


 


Urine Occult Blood     (Negative)  


 


Urine Nitrite     (Negative)  


 


Urine Bilirubin     (Negative)  


 


Urine Urobilinogen     (0.2-1.0)  


 


Ur Leukocyte Esterase     (Negative)  


 


Urine RBC     (0-5)  /hpf


 


Urine WBC     (0-5)  /hpf


 


Ur Epithelial Cells     (0-5)  /hpf


 


Urine Bacteria     (FEW)  /hpf


 


Urine Mucus     (FEW)  /hpf


 


C trachomatis DNA (PCR)    Not detected  


 


N gonorrhoeae DNA (PCR)    Not detected  














  11/29/17 Range/Units





  22:02 


 


WBC   (3.98-10.04)  K/mm3


 


RBC   (3.98-5.22)  M/mm3


 


Hgb   (11.2-15.7)  gm/L


 


Hct   (34.1-44.9)  %


 


MCV   (79.4-94.8)  fl


 


MCH   (25.6-32.2)  pg


 


MCHC   (32.2-35.5)  g/dl


 


RDW Std Deviation   (36.4-46.3)  fL


 


Plt Count   (182-369)  K/mm3


 


MPV   (9.4-12.3)  fl


 


Neutrophils % (Manual)   (40-60)  %


 


Band Neutrophils %   (0-10)  %


 


Lymphocytes % (Manual)   (20-40)  %


 


Atypical Lymphs %   %


 


Monocytes % (Manual)   (2-10)  %


 


Eosinophils % (Manual)   (0.7-5.8)  %


 


Basophils % (Manual)   (0.1-1.2)  


 


Platelet Estimate   


 


Plt Morphology Comment   


 


RBC Morph Comment   


 


Sodium   (136-145)  mEq/L


 


Potassium   (3.5-5.1)  mEq/L


 


Chloride   ()  mEq/L


 


Carbon Dioxide   (21-32)  mEq/L


 


Anion Gap   (5-15)  


 


BUN   (7-18)  mg/dL


 


Creatinine   (0.55-1.02)  mg/dL


 


Est Cr Clr Drug Dosing   mL/min


 


Estimated GFR (MDRD)   (>60)  mL/min


 


BUN/Creatinine Ratio   (14-18)  


 


Glucose   ()  mg/dL


 


Calcium   (8.5-10.1)  mg/dL


 


Total Bilirubin   (0.2-1.0)  mg/dL


 


AST   (15-37)  U/L


 


ALT   (14-59)  U/L


 


Alkaline Phosphatase   ()  U/L


 


Total Protein   (6.4-8.2)  g/dl


 


Albumin   (3.4-5.0)  g/dl


 


Globulin   gm/dL


 


Albumin/Globulin Ratio   (1-2)  


 


Urine Color  Yellow  (Yellow)  


 


Urine Appearance  Clear  (Clear)  


 


Urine pH  6.0  (5.0-8.0)  


 


Ur Specific Gravity  1.015  (1.005-1.030)  


 


Urine Protein  Negative  (Negative)  


 


Urine Glucose (UA)  Negative  (Negative)  


 


Urine Ketones  Negative  (Negative)  


 


Urine Occult Blood  1+ H  (Negative)  


 


Urine Nitrite  Negative  (Negative)  


 


Urine Bilirubin  Negative  (Negative)  


 


Urine Urobilinogen  0.2  (0.2-1.0)  


 


Ur Leukocyte Esterase  Negative  (Negative)  


 


Urine RBC  0-5  (0-5)  /hpf


 


Urine WBC  Not seen  (0-5)  /hpf


 


Ur Epithelial Cells  0-5  (0-5)  /hpf


 


Urine Bacteria  Not seen  (FEW)  /hpf


 


Urine Mucus  Not seen  (FEW)  /hpf


 


C trachomatis DNA (PCR)   


 


N gonorrhoeae DNA (PCR)   











Meds: 


Medications














Discontinued Medications














Generic Name Dose Route Start Last Admin





  Trade Name Freq  PRN Reason Stop Dose Admin


 


Hydrocodone Bitart/Acetaminophen  1 tab  11/29/17 23:23  11/29/17 23:40





  Norco 325-5 Mg  PO  11/29/17 23:24  1 tab





  ONETIME ONE   Administration


 


Ondansetron HCl  4 mg  11/29/17 23:22  11/29/17 23:27





  Zofran Odt  PO  11/29/17 23:23  4 mg





  ONETIME ONE   Administration














- Re-Assessments/Exams


Free Text/Narrative Re-Assessment/Exam: 





11/30/17 00:34


Urinalysis unremarkable GC and Chlamydia negative wet mount is suggestive of 

many clue cells we'll treat for BV. I will give the patient a few hydrocodone a 

few Zofran as the tramadol is causing stomach upset





Departure





- Departure


Time of Disposition: 00:38


Disposition: Home, Self-Care 01


Clinical Impression: 


 Pelvic pain, Endometriosis, BV (bacterial vaginosis)








- Discharge Information


Prescriptions: 


metroNIDAZOLE [Flagyl] 500 mg PO Q12H #14 tablet


Referrals: 


Donavon De León [Primary Care Provider] - 


Forms:  ED Department Discharge


Additional Instructions: 


Return to emergency room if any questions problems worsening symptoms.





You been started on Flagyl, this is an antibiotic filled at the pharmacy first 

thing in the morning. This is for bacterial vaginosis.





I have given him a few hydrocodone 5/325 #10 from the machine in the waiting 

room. Take one every 4-6 hours as needed for pain do not take with your 

tramadol. Allow 12 hours after using the hydrocodone before driving or 

returning to work. I have also given you some Zofran 4 mg #10 from the machine 

in the waiting room use 1 every 4-6 hours as needed for nausea and vomiting.





Follow-up with gynecologist as scheduled sooner if he can get in sooner.





Follow-up with your regular physician here in town at the end of this week or 

early next week.

## 2017-12-06 NOTE — EDM.PDOC
ED HPI GENERAL MEDICAL PROBLEM





- General


Chief Complaint: OB/GYN Problem


Stated Complaint: BLEEDING IN RECTUM VAGINAL PAIN


Time Seen by Provider: 12/06/17 00:31


Source of Information: Reports: Patient, RN Notes Reviewed





- History of Present Illness


INITIAL COMMENTS - FREE TEXT/NARRATIVE: 





27-year-old female comes in with complaint of pelvic and rectal discomfort, 

rectal bleeding, nausea vomiting and diarrhea. She states she has history of 

endometriosis, kidney stones. She states that pain medication she's been taking 

recently have been "upsetting her stomach. She states she was started on Flagyl 

after a visit to the ED about a week ago. She saw her regular provider 

yesterday she was diagnosed with a yeast infection, told to stop the Flagyl and 

was put on oral medication for the yeast infection. She states she has voiding 

frequency but no major burning or dysuria. No fever or chills.  She does have 

history of prior hysterectomy.


  ** Rectal


Pain Score (Numeric/FACES): 10





- Related Data


 Allergies











Allergy/AdvReac Type Severity Reaction Status Date / Time


 


codeine Allergy  Rash Verified 12/06/17 00:24


 


morphine Allergy  Hives Verified 12/06/17 00:24


 


ketorolac tromethamine AdvReac  Not good Verified 12/06/17 00:24





[From Toradol]   for her  





   kidneys  


 


lorazepam [From Ativan] AdvReac  Hallucinati Verified 12/06/17 00:24





   ons  


 


metoclopramide HCl AdvReac  Irritabilit Verified 12/06/17 00:24





[From Reglan]   y  











Home Meds: 


 Home Meds





traMADol [Ultram] 50 mg PO Q8H PRN #12 tablet 10/28/17 [Rx]


ALPRAZolam [Alprazolam] 0.5 mg PO BEDTIME PRN 11/29/17 [History]


ALPRAZolam [Alprazolam] 0.5 mg PO BEDTIME PRN 11/29/17 [History]


Ondansetron [Zofran ODT] 4 mg PO Q8H PRN #10 tab.dis 12/06/17 [Rx]











Past Medical History





- Past Health History


Medical/Surgical History: Denies Medical/Surgical History


HEENT History: Reports: Impaired Vision


Other HEENT History: glasses and contacts


Gastrointestinal History: Reports: Hemorrhoids


Other Gastrointestinal History: Rectal fissures


Genitourinary History: Reports: Pyelonephritis, UTI, Recurrent


Other Genitourinary History: endometreosis, kidney stones


OB/GYN History: Reports: Endometriosis, Pregnancy


Neurological History: Reports: Migraines


Psychiatric History: Reports: Anxiety


Hematologic History: Reports: Anemia


Oncologic (Cancer) History: Reports: Uterine


Dermatologic History: Reports: Eczema





- Past Surgical History


Female  Surgical History: Reports: Hysterectomy, Kidney stone extraction, 

Ureteral Stent





Social & Family History





- Family History


Family Medical History: Noncontributory





- Tobacco Use


Smoking Status *Q: Never Smoker


Years of Tobacco use: 5


Used Tobacco, but Quit: Yes


Month Tobacco Last Used: 2


Second Hand Smoke Exposure: Yes





- Caffeine Use


Caffeine Use: Reports: None





- Alcohol Use


Days Per Week of Alcohol Use: 0





- Recreational Drug Use


Recreational Drug Use: No


Drug Use in Last 12 Months: No





- Living Situation & Occupation


Occupation: Unemployed





ED ROS GENERAL





- Review of Systems


Review Of Systems: See Below


Constitutional: Denies: Fever, Chills


HEENT: Denies: Throat Pain


Respiratory: Denies: Shortness of Breath


Cardiovascular: Denies: Chest Pain


GI/Abdominal: Reports: Abdominal Pain (Primarily lower pelvic discomfort), 

Diarrhea (Loose stools with some blood tinged mucus recently), Nausea, Vomiting 

(Primarily dry heaves)


: Reports: Frequency


Musculoskeletal: Denies: Back Pain


Neurological: Reports: No Symptoms





ED EXAM, GI/ABD





- Physical Exam


Exam: See Below


General Appearance: Alert, Anxious


Eyes: Bilateral: Normal Appearance


Throat/Mouth: Normal Inspection, Normal Oropharynx, Other (Oral mucosa is moist)


Neck: Supple


Respiratory/Chest: No Respiratory Distress, Lungs Clear


Cardiovascular: Tachycardia


GI/Abdominal Exam: Soft, Tender (Upper mid abdomen and lower mid abdomen).  No: 

Guarding, Rebound


Rectal (Female) Exam: Normal Exam, Normal Rectal Tone, Heme - Stool, Other (No 

blood).  No: Hemorrhoids, Rectal Fissure


Back Exam: Other (Mild tenderness bilateral low back)


Extremities: Normal Inspection, Normal Range of Motion


Neurological: Alert, Oriented, No Motor/Sensory Deficits


Skin Exam: Warm, Dry, Normal Color





Course





- Vital Signs


Last Recorded V/S: 


 Last Vital Signs











Temp  98.7 F   12/06/17 00:18


 


Pulse  107 H  12/06/17 00:18


 


Resp  17   12/06/17 00:18


 


BP  130/94 H  12/06/17 00:18


 


Pulse Ox  100   12/06/17 00:18














- Orders/Labs/Meds


Labs: 


 Laboratory Tests











  12/06/17 Range/Units





  01:10 


 


Urine Color  Light yellow  (Yellow)  


 


Urine Appearance  Clear  (Clear)  


 


Urine pH  7.5  (5.0-8.0)  


 


Ur Specific Gravity  1.020  (1.005-1.030)  


 


Urine Protein  Negative  (Negative)  


 


Urine Glucose (UA)  Negative  (Negative)  


 


Urine Ketones  Negative  (Negative)  


 


Urine Occult Blood  Negative  (Negative)  


 


Urine Nitrite  Negative  (Negative)  


 


Urine Bilirubin  Negative  (Negative)  


 


Urine Urobilinogen  0.2  (0.2-1.0)  


 


Ur Leukocyte Esterase  Negative  (Negative)  


 


Urine RBC  Not seen  (0-5)  /hpf


 


Urine WBC  0-5  (0-5)  /hpf


 


Ur Epithelial Cells  Not seen  (0-5)  /hpf


 


Urine Bacteria  Not seen  (FEW)  /hpf


 


Urine Mucus  Not seen  (FEW)  /hpf











Meds: 


Medications














Discontinued Medications














Generic Name Dose Route Start Last Admin





  Trade Name Freq  PRN Reason Stop Dose Admin


 


Hydromorphone HCl  1 mg  12/06/17 00:55  12/06/17 01:08





  Dilaudid  IM  12/06/17 00:56  1 mg





  ONETIME ONE   Administration


 


Hydromorphone HCl  0.5 mg  12/06/17 02:18  12/06/17 02:29





  Dilaudid  IM  12/06/17 02:19  0.5 mg





  ONETIME ONE   Administration


 


Ondansetron HCl  4 mg  12/06/17 00:55  12/06/17 01:08





  Zofran Odt  PO  12/06/17 00:56  4 mg





  ONETIME ONE   Administration














- Re-Assessments/Exams


Free Text/Narrative Re-Assessment/Exam: 





12/06/17 02:52


There is no blood on rectal exam as documented, UA did come back negative. She 

had a pelvic exam yesterday at the clinic so that has not been repeated. 

Discharge instructions as documented





Departure





- Departure


Time of Disposition: 02:19


Disposition: Home, Self-Care 01


Condition: Fair


Clinical Impression: 


 Endometriosis, Diarrhea





Vomiting


Qualifiers:


 Vomiting type: unspecified Vomiting Intractability: non-intractable Nausea 

presence: with nausea Qualified Code(s): R11.2 - Nausea with vomiting, 

unspecified








- Discharge Information


Prescriptions: 


Ondansetron [Zofran ODT] 4 mg PO Q8H PRN #10 tab.dis


 PRN Reason: Nausea/Vomiting


Instructions:  Diarrhea, Adult, Endometriosis


Referrals: 


Donavon De León [Primary Care Provider] - 


Forms:  ED Department Discharge


Additional Instructions: 


Clear liquids and bland diet as tolerated, Zofran if needed for nausea or 

vomiting,  probiotic which is available OTC, take that twice daily for at least 

2 weeks and thereafter as needed,  famotidine, the generic for Pepcid 20 mg 

daily to reduce stomach acidity.  That is also available OTC.  Follow-up with 

Dr. De León as needed. See your OB Gynecologist later this month as planned.

## 2017-12-11 NOTE — EDM.PDOC
ED HPI GENERAL MEDICAL PROBLEM





- General


Chief Complaint: Genitourinary Problem


Stated Complaint: VARIOUS ISSUES SENT BY DR. STEPHENSON


Time Seen by Provider: 12/11/17 19:41


Source of Information: Reports: Patient, Old Records (recent clinic records)


History Limitations: Reports: No Limitations





- History of Present Illness


INITIAL COMMENTS - FREE TEXT/NARRATIVE: 





27 year old female present to the ER for various complaints. He greatest 

complaint is vaginal pain and bilateral flank pain. Patient has a history of 

endometrosis and kidney stones. Reports she needs surgery for worsening 

endometrosis. She is scheduled to see her surgeon in [place] on 12-20-17. 

Reports 1-2 months ago she was diagnosed with kidney stones on the right side. 

States she gets frequent kidney stones, yeast infections and UTIs. Has been on 

diflucan and antibiotics recently for these problems. 





Reports Saturday she developed headaches, malaise, fatigue and bodyaches. No 

cough. Also has nausea, vomiting and diarrhea. Reports the diarrhea is 

worsening her hemorrhoids. Has tried Azo and zofran with no relief.





 No flu shot this year. 


Treatments PTA: Reports: Other (see below)


Other Treatments PTA: OTC azo


  ** Generalized


Pain Score (Numeric/FACES): 8





- Related Data


 Allergies











Allergy/AdvReac Type Severity Reaction Status Date / Time


 


codeine Allergy  Rash Verified 12/06/17 00:24


 


morphine Allergy  Hives Verified 12/06/17 00:24


 


ketorolac tromethamine AdvReac  Not good Verified 12/06/17 00:24





[From Toradol]   for her  





   kidneys  


 


lorazepam [From Ativan] AdvReac  Hallucinati Verified 12/06/17 00:24





   ons  


 


metoclopramide HCl AdvReac  Irritabilit Verified 12/06/17 00:24





[From Reglan]   y  











Home Meds: 


 Home Meds





traMADol [Ultram] 50 mg PO Q8H PRN #12 tablet 10/28/17 [Rx]


ALPRAZolam [Alprazolam] 0.5 mg PO BEDTIME PRN 11/29/17 [History]


Ondansetron [Zofran ODT] 4 mg PO Q8H PRN #10 tab.dis 12/06/17 [Rx]


Nitrofurantoin Monohyd/M-Cryst [Macrobid 100 mg Capsule] 100 mg PO BID #10 

capsule 12/11/17 [Rx]











Past Medical History





- Past Health History


Medical/Surgical History: Denies Medical/Surgical History


HEENT History: Reports: Impaired Vision


Other HEENT History: glasses and contacts


Gastrointestinal History: Reports: Hemorrhoids


Other Gastrointestinal History: Rectal fissures


Genitourinary History: Reports: Pyelonephritis, UTI, Recurrent


Other Genitourinary History: endometreosis, kidney stones


OB/GYN History: Reports: Endometriosis, Pregnancy


Neurological History: Reports: Migraines


Psychiatric History: Reports: Anxiety


Hematologic History: Reports: Anemia


Oncologic (Cancer) History: Reports: Uterine


Dermatologic History: Reports: Eczema





- Past Surgical History


Female  Surgical History: Reports: Hysterectomy, Kidney stone extraction, 

Ureteral Stent





Social & Family History





- Family History


Family Medical History: Noncontributory





- Tobacco Use


Smoking Status *Q: Never Smoker


Years of Tobacco use: 5


Used Tobacco, but Quit: Yes


Month Tobacco Last Used: 2


Second Hand Smoke Exposure: Yes





- Caffeine Use


Caffeine Use: Reports: Coffee, Soda, Tea





- Alcohol Use


Days Per Week of Alcohol Use: 0





- Recreational Drug Use


Recreational Drug Use: No


Drug Use in Last 12 Months: No





- Living Situation & Occupation


Occupation: Unemployed





ED ROS GENERAL





- Review of Systems


Review Of Systems: See Below


Constitutional: Reports: Fever, Chills, Malaise, Weakness, Fatigue, Other (

reports bodyahces)


Respiratory: Denies: Cough


GI/Abdominal: Reports: Diarrhea, Nausea, Vomiting


: Reports: Dysuria, Flank Pain (bilateral), Pain (reports vaginal ppain)


Neurological: Reports: Headache





ED EXAM, RENAL/





- Physical Exam


Exam: See Below


Exam Limited By: No Limitations


General Appearance: Alert, WD/WN, No Apparent Distress


Eye Exam: Bilateral Eye: Normal Inspection


Ears: Normal External Exam, Normal Canal, Hearing Grossly Normal, Normal TMs


Nose: Normal Inspection


Throat/Mouth: Normal Inspection, Normal Voice, No Airway Compromise


Neck: Normal Inspection


Respiratory/Chest: No Respiratory Distress, Lungs Clear, Normal Breath Sounds


Cardiovascular: Normal Peripheral Pulses, Regular Rate, Rhythm, No Murmur


GI/Abdominal: Normal Bowel Sounds, Soft, Non-Tender


Back Exam: Normal Inspection.  No: CVA Tenderness (L), CVA Tenderness (R)


Neurological: Alert, Oriented, Normal Cognition


Psychiatric: Normal Affect, Normal Mood


Skin Exam: Warm, Dry, Normal Color





Course





- Vital Signs


Last Recorded V/S: 


 Last Vital Signs











Temp  36.8 C   12/11/17 18:54


 


Pulse  88   12/11/17 18:54


 


Resp  20   12/11/17 18:54


 


BP  116/75   12/11/17 18:54


 


Pulse Ox  95   12/11/17 18:54














- Orders/Labs/Meds


Labs: 


 Laboratory Tests











  12/11/17 12/11/17 12/11/17 Range/Units





  20:00 20:15 20:15 


 


WBC   9.56   (3.98-10.04)  K/mm3


 


RBC   4.15   (3.98-5.22)  M/mm3


 


Hgb   12.6   (11.2-15.7)  gm/L


 


Hct   38.2   (34.1-44.9)  %


 


MCV   92.0   (79.4-94.8)  fl


 


MCH   30.4   (25.6-32.2)  pg


 


MCHC   33.0   (32.2-35.5)  g/dl


 


RDW Std Deviation   40.1   (36.4-46.3)  fL


 


Plt Count   244   (182-369)  K/mm3


 


MPV   10.2   (9.4-12.3)  fl


 


Neut % (Auto)   66.8   (34.0-71.1)  %


 


Lymph % (Auto)   25.0   (19.3-51.7)  %


 


Mono % (Auto)   5.6   (4.7-12.5)  %


 


Eos % (Auto)   2.1   (0.7-5.8)  


 


Baso % (Auto)   0.3   (0.1-1.2)  %


 


Neut # (Auto)   6.38 H   (1.56-6.13)  K/mm3


 


Lymph # (Auto)   2.39   (1.18-3.74)  K/mm3


 


Mono # (Auto)   0.54 H   (0.24-0.36)  K/mm3


 


Eos # (Auto)   0.20   (0.04-0.36)  K/mm3


 


Baso # (Auto)   0.03   (0.01-0.08)  K/mm3


 


Sodium    139  (136-145)  mEq/L


 


Potassium    3.5  (3.5-5.1)  mEq/L


 


Chloride    107  ()  mEq/L


 


Carbon Dioxide    26  (21-32)  mEq/L


 


Anion Gap    9.5  (5-15)  


 


BUN    12  (7-18)  mg/dL


 


Creatinine    0.8  (0.55-1.02)  mg/dL


 


Est Cr Clr Drug Dosing    85.83  mL/min


 


Estimated GFR (MDRD)    > 60  (>60)  mL/min


 


BUN/Creatinine Ratio    15.0  (14-18)  


 


Glucose    88  ()  mg/dL


 


Calcium    8.5  (8.5-10.1)  mg/dL


 


Total Bilirubin    0.4  (0.2-1.0)  mg/dL


 


AST    17  (15-37)  U/L


 


ALT    24  (14-59)  U/L


 


Alkaline Phosphatase    57  ()  U/L


 


Total Protein    6.5  (6.4-8.2)  g/dl


 


Albumin    3.2 L  (3.4-5.0)  g/dl


 


Globulin    3.3  gm/dL


 


Albumin/Globulin Ratio    1.0  (1-2)  


 


Urine Color  Yellow    (Yellow)  


 


Urine Appearance  Clear    (Clear)  


 


Urine pH  7.0    (5.0-8.0)  


 


Ur Specific Gravity  1.020    (1.005-1.030)  


 


Urine Protein  Negative    (Negative)  


 


Urine Glucose (UA)  Trace H    (Negative)  


 


Urine Ketones  Negative    (Negative)  


 


Urine Occult Blood  Negative    (Negative)  


 


Urine Nitrite  Positive H    (Negative)  


 


Urine Bilirubin  Negative    (Negative)  


 


Urine Urobilinogen  1.0    (0.2-1.0)  


 


Ur Leukocyte Esterase  Negative    (Negative)  


 


Urine RBC  0-5    (0-5)  /hpf


 


Urine WBC  0-5    (0-5)  /hpf


 


Ur Epithelial Cells  5-10 H    (0-5)  /hpf


 


Urine Bacteria  Few    (FEW)  /hpf


 


Urine Mucus  Not seen    (FEW)  /hpf











Meds: 


Medications














Discontinued Medications














Generic Name Dose Route Start Last Admin





  Trade Name Freq  PRN Reason Stop Dose Admin


 


Hydromorphone HCl  1 mg  12/11/17 22:12  12/11/17 22:30





  Dilaudid  IM  12/11/17 22:13  1 mg





  ONETIME ONE   Administration


 


Ketorolac Tromethamine  30 mg  12/11/17 20:04  12/11/17 20:28





  Toradol  IM  12/11/17 20:05  Not Given





  ONETIME ONE   


 


Promethazine HCl  12.5 mg  12/11/17 20:04  12/11/17 20:22





  Phenergan  IM  12/11/17 20:05  12.5 mg





  ONETIME ONE   Administration














- Re-Assessments/Exams


Free Text/Narrative Re-Assessment/Exam: 





12/11/17 22:13


Patient searched on [place]  [facility]. 20 Rx from 8 prescribers within the last year 

for controlled substances. Most recently received norco #10 on 11-30-17.





Clinic notes reviewed, no mention of instruction to come to the ER.





Patient was offered toradol for pain. Declined. "Not good for kidneys" is not a 

true allergy. 





Influenza is positive for type A. Out of window for tamiflu. 





Will treat for a UTI with + nitrites on UA.





Discharge instructions as documented. 





Departure





- Departure


Time of Disposition: 22:13


Disposition: Home, Self-Care 01


Condition: Fair


Clinical Impression: 


 Urinary tract infection, Influenza A








- Discharge Information


Prescriptions: 


Nitrofurantoin Monohyd/M-Cryst [Macrobid 100 mg Capsule] 100 mg PO BID #10 

capsule


Instructions:  Influenza, Adult, Easy-to-Read, Urinary Tract Infection, Adult


Referrals: 


Josemanuel Avila MD [Primary Care Provider] - 


Forms:  ED Department Discharge


Additional Instructions: 


Macrobid twice a day for 5 days.





Make sure drinking plenty of fluids.





Over-the-counter Tylenol or Motrin as needed for additional pain relief.





Follow up with your primary care provider within 2 weeks for recheck of your 

symptoms.





Please return to the ER if your symptoms change or worsen.

## 2017-12-24 NOTE — EDM.PDOC
ED HPI GENERAL MEDICAL PROBLEM





- General


Chief Complaint: OB/GYN Problem


Stated Complaint: vaginal pain


Time Seen by Provider: 12/24/17 20:00


Source of Information: Reports: Patient


History Limitations: Reports: No Limitations





- History of Present Illness


INITIAL COMMENTS - FREE TEXT/NARRATIVE: 





The patient presents with pelvic and vaginal pain.  She has a history of 

endometriosis and ovarian cysts.  She recently saw her doctor Dr Dixon on 

December 20th and he will take her ovaries on January 8th and look for the 

endometriosis.  She was offered something stronger 4 days ago but she only 

wanted tramadol.  That is not helping and she has nausea and vomiting.  She has 

no dysuria, fever, chills, cough or chest pain.


Onset: Gradual


Duration: Week(s):


Location: Reports: Pelvis


Quality: Reports: Sharp


Severity: Severe


Improves with: Reports: None


Worsens with: Reports: None


Associated Symptoms: Reports: Nausea/Vomiting.  Denies: Chest Pain, Cough, Fever

/Chills, Headaches, Shortness of Breath


  ** Vaginal


Pain Score (Numeric/FACES): 9





- Related Data


 Allergies











Allergy/AdvReac Type Severity Reaction Status Date / Time


 


codeine Allergy  Rash Verified 12/06/17 00:24


 


morphine Allergy  Hives Verified 12/06/17 00:24


 


ketorolac tromethamine AdvReac  Not good Verified 12/06/17 00:24





[From Toradol]   for her  





   kidneys  


 


lorazepam [From Ativan] AdvReac  Hallucinati Verified 12/06/17 00:24





   ons  


 


metoclopramide HCl AdvReac  Irritabilit Verified 12/06/17 00:24





[From Reglan]   y  











Home Meds: 


 Home Meds





traMADol [Ultram] 50 mg PO Q8H PRN #12 tablet 10/28/17 [Rx]


ALPRAZolam [Alprazolam] 0.5 mg PO BEDTIME PRN 11/29/17 [History]


Ondansetron [Zofran ODT] 4 mg PO Q8H PRN #10 tab.dis 12/06/17 [Rx]











Past Medical History





- Past Health History


Medical/Surgical History: Denies Medical/Surgical History


HEENT History: Reports: Impaired Vision


Other HEENT History: glasses and contacts


Gastrointestinal History: Reports: Hemorrhoids


Other Gastrointestinal History: Rectal fissures


Genitourinary History: Reports: Pyelonephritis, UTI, Recurrent


Other Genitourinary History: endometreosis, kidney stones


OB/GYN History: Reports: Endometriosis, Pregnancy


Neurological History: Reports: Migraines


Psychiatric History: Reports: Anxiety


Hematologic History: Reports: Anemia


Oncologic (Cancer) History: Reports: Uterine


Dermatologic History: Reports: Eczema





- Past Surgical History


Female  Surgical History: Reports: Hysterectomy, Kidney stone extraction, 

Ureteral Stent





Social & Family History





- Family History


Family Medical History: Noncontributory





- Tobacco Use


Smoking Status *Q: Never Smoker


Years of Tobacco use: 5


Used Tobacco, but Quit: Yes


Month Tobacco Last Used: 2


Second Hand Smoke Exposure: Yes





- Caffeine Use


Caffeine Use: Reports: Coffee, Soda





- Alcohol Use


Days Per Week of Alcohol Use: 0





- Recreational Drug Use


Recreational Drug Use: No


Drug Use in Last 12 Months: No





- Living Situation & Occupation


Occupation: Unemployed





ED ROS GENERAL





- Review of Systems


Review Of Systems: See Below


Constitutional: Reports: No Symptoms


HEENT: Reports: No Symptoms


Respiratory: Reports: No Symptoms


Cardiovascular: Reports: No Symptoms


Endocrine: Reports: No Symptoms


GI/Abdominal: Reports: Abdominal Pain, Nausea, Vomiting


: Reports: Other (pelvic pain)





ED EXAM, GI/ABD





- Physical Exam


Exam: See Below


Exam Limited By: No Limitations


General Appearance: Alert, No Apparent Distress


Ears: Normal External Exam


Nose: Normal Inspection


Head: Atraumatic, Normocephalic


Neck: Normal Inspection


Respiratory/Chest: No Respiratory Distress, Lungs Clear, Normal Breath Sounds


Cardiovascular: Regular Rate, Rhythm, No Edema, No Murmur


GI/Abdominal Exam: Soft, No Organomegaly, No Mass, Tender (Moderate pain upon 

palpation to the superpubic area)





Course





- Vital Signs


Last Recorded V/S: 





 Last Vital Signs











Temp  97.7 F   12/24/17 19:46


 


Pulse  79   12/24/17 19:46


 


Resp  20   12/24/17 19:46


 


BP  130/81   12/24/17 19:46


 


Pulse Ox  99   12/24/17 19:46














- Orders/Labs/Meds


Orders: 





 Active Orders 24 hr











 Category Date Time Status


 


 HYDROmorphone [Dilaudid] Med  12/24/17 20:07 Once





 1 mg IM ONETIME ONE   


 


 Promethazine [Phenergan] Med  12/24/17 20:07 Once





 25 mg IM ONETIME ONE   








 Medication Orders





Hydromorphone HCl (Dilaudid)  1 mg IM ONETIME ONE


   Stop: 12/24/17 20:08


Promethazine HCl (Phenergan)  25 mg IM ONETIME ONE


   Stop: 12/24/17 20:08








Meds: 





Medications











Generic Name Dose Route Start Last Admin





  Trade Name Daniel  PRN Reason Stop Dose Admin


 


Hydromorphone HCl  1 mg  12/24/17 20:07  





  Dilaudid  IM  12/24/17 20:08  





  ONETIME ONE   


 


Promethazine HCl  25 mg  12/24/17 20:07  





  Phenergan  IM  12/24/17 20:08  





  ONETIME ONE   














- Re-Assessments/Exams


Free Text/Narrative Re-Assessment/Exam: 





12/24/17 20:11


I ordered a shot of dilaudid and phenergan and I will discharge her home.





Departure





- Departure


Time of Disposition: 20:15


Disposition: Home, Self-Care 01


Condition: Good


Clinical Impression: 


 Pelvic pain








- Discharge Information


Referrals: 


Donavon De León [Primary Care Provider] - 


Additional Instructions: 


Take the zofran as needed for pain and the percocet as needed.  Follow up with 

your doctor next week.





- My Orders


Last 24 Hours: 





My Active Orders





12/24/17 20:07


HYDROmorphone [Dilaudid]   1 mg IM ONETIME ONE 


Promethazine [Phenergan]   25 mg IM ONETIME ONE 














- Assessment/Plan


Last 24 Hours: 





My Active Orders





12/24/17 20:07


HYDROmorphone [Dilaudid]   1 mg IM ONETIME ONE 


Promethazine [Phenergan]   25 mg IM ONETIME ONE

## 2017-12-25 NOTE — EDM.PDOC
ED HPI GENERAL MEDICAL PROBLEM





- General


Chief Complaint: Genitourinary Problem


Stated Complaint: PAIN/BURNING IN PERSONAL AREA


Time Seen by Provider: 12/25/17 17:47


Source of Information: Reports: Patient


History Limitations: Reports: No Limitations





- History of Present Illness


INITIAL COMMENTS - FREE TEXT/NARRATIVE: 


Patient is a 27-year-old female presents ED complaining of suprapubic abdominal 

pain with burning sensation with urination. In addition has increased frequency 

as well. She has a history of recent UTI approximately 1.5 weeks ago and was on 

Cipro. Prior to that she had a yeast infection. She denies any rashes to the 

genital area. States she has a history of endometriosis and has undergone since 

2015 multiple surgeries for this. 2015 had a hysterectomy. She has had two 

additional surgeries to clean up the remaining endometriosis within the 

abdominal cavity. She is scheduled for surgery January 8, 2017 to have her 

ovaries removed. She sees Dr. Osman with [facility] [place]. 

She believes this is a UTI with the current symptoms she is experiencing. She 

was evaluated last night for pain to her lower abdomen to which she thought was 

related to the endometriosis. Today she believes it is a UTI. No urine sample 

was obtained to test for infection since the patient was in a rush to leave. 

She received Dilaudid 1 mg IM while in the ED with relief of discomfort. States 

she awoke this morning with increasing pain to the lower abdomen and took one 

Percocet  milligrams tabs with insufficient pain control. States she was 

recently evaluated by Dr. Dixon this past week and was offered something 

stronger for the pain. Patient refused the Dilaudid and was prescribed the 

tramadol. Tramadol was not working and thus prompted evaluation in ED last 

night. She has no fever, chills, nausea/vomiting, dizziness, abnormal vaginal 

discharge, rash, or any additional complaints.











  ** Groin


Pain Score (Numeric/FACES): 10





- Related Data


 Allergies











Allergy/AdvReac Type Severity Reaction Status Date / Time


 


codeine Allergy  Rash Verified 12/25/17 17:20


 


morphine Allergy  Hives Verified 12/25/17 17:20


 


ketorolac tromethamine AdvReac  Not good Verified 12/25/17 17:20





[From Toradol]   for her  





   kidneys  


 


lorazepam [From Ativan] AdvReac  Hallucinati Verified 12/25/17 17:20





   ons  


 


metoclopramide HCl AdvReac  Irritabilit Verified 12/25/17 17:20





[From Reglan]   y  











Home Meds: 


 Home Meds





traMADol [Ultram] 50 mg PO Q8H PRN #12 tablet 10/28/17 [Rx]


ALPRAZolam [Alprazolam] 0.5 mg PO BEDTIME PRN 11/29/17 [History]


Ondansetron [Zofran ODT] 4 mg PO Q8H PRN #10 tab.dis 12/06/17 [Rx]











Past Medical History





- Past Health History


Medical/Surgical History: Denies Medical/Surgical History


HEENT History: Reports: Impaired Vision


Other HEENT History: glasses and contacts


Gastrointestinal History: Reports: Hemorrhoids


Other Gastrointestinal History: Rectal fissures


Genitourinary History: Reports: Pyelonephritis, UTI, Recurrent


Other Genitourinary History: endometreosis, kidney stones


OB/GYN History: Reports: Endometriosis, Pregnancy


Neurological History: Reports: Migraines


Psychiatric History: Reports: Anxiety


Hematologic History: Reports: Anemia


Oncologic (Cancer) History: Reports: Uterine


Dermatologic History: Reports: Eczema





- Past Surgical History


Female  Surgical History: Reports: Hysterectomy, Kidney stone extraction, 

Ureteral Stent





Social & Family History





- Family History


Family Medical History: Noncontributory





- Tobacco Use


Smoking Status *Q: Former Smoker


Years of Tobacco use: 5


Used Tobacco, but Quit: No


Month Tobacco Last Used: 2


Second Hand Smoke Exposure: Yes





- Caffeine Use


Caffeine Use: Reports: Coffee, Soda





- Alcohol Use


Days Per Week of Alcohol Use: 0





- Recreational Drug Use


Recreational Drug Use: No


Drug Use in Last 12 Months: No





- Living Situation & Occupation


Occupation: Unemployed





ED ROS GENERAL





- Review of Systems


Review Of Systems: ROS reveals no pertinent complaints other than HPI.





ED EXAM, RENAL/





- Physical Exam


Exam: See Below


Exam Limited By: No Limitations


General Appearance: Alert, WD/WN, No Apparent Distress


Ears: Hearing Grossly Normal


Nose: Normal Inspection


Throat/Mouth: Normal Voice, No Airway Compromise


Neck: Normal Inspection, Supple


Respiratory/Chest: No Respiratory Distress, Lungs Clear, Normal Breath Sounds, 

No Accessory Muscle Use


Cardiovascular: Normal Peripheral Pulses, Regular Rate, Rhythm


GI/Abdominal: Normal Bowel Sounds, Soft, No Organomegaly, No Distention, Tender 

(Suprapubic region)


 (Female) Exam: Deferred


Back Exam: Normal Inspection.  No: CVA Tenderness (L), CVA Tenderness (R)


Neurological: Alert, Oriented, CN II-XII Intact, Normal Cognition, No Motor/

Sensory Deficits


Psychiatric: Normal Affect, Normal Mood


Skin Exam: Warm, Dry, Intact, Normal Color





Course





- Vital Signs


Last Recorded V/S: 


 Last Vital Signs











Temp  97.5 F   12/25/17 17:20


 


Pulse  95   12/25/17 17:20


 


Resp  15   12/25/17 17:20


 


BP  138/83   12/25/17 17:20


 


Pulse Ox  98   12/25/17 17:20














- Orders/Labs/Meds


Labs: 


 Laboratory Tests











  12/25/17 Range/Units





  17:20 


 


Urine Color  Yellow  (Yellow)  


 


Urine Appearance  Slt cloudy H  (Clear)  


 


Urine pH  7.0  (5.0-8.0)  


 


Ur Specific Gravity  1.025  (1.005-1.030)  


 


Urine Protein  Negative  (Negative)  


 


Urine Glucose (UA)  Negative  (Negative)  


 


Urine Ketones  Trace H  (Negative)  


 


Urine Occult Blood  1+ H  (Negative)  


 


Urine Nitrite  Negative  (Negative)  


 


Urine Bilirubin  Negative  (Negative)  


 


Urine Urobilinogen  0.2  (0.2-1.0)  


 


Ur Leukocyte Esterase  Negative  (Negative)  


 


Urine RBC  0-5  (0-5)  /hpf


 


Urine WBC  0-5  (0-5)  /hpf


 


Ur Epithelial Cells  5-10 H  (0-5)  /hpf


 


Urine Bacteria  Many H  (FEW)  /hpf


 


Urine Mucus  Few  (FEW)  /hpf











Meds: 


Medications














Discontinued Medications














Generic Name Dose Route Start Last Admin





  Trade Name Freq  PRN Reason Stop Dose Admin


 


Hydrocodone Bitart/Acetaminophen  2 tab  12/25/17 20:05  12/25/17 20:10





  Norco 325-5 Mg  PO  12/25/17 20:06  2 tab





  ONETIME ONE   Administration


 


Phenazopyridine HCl  95 mg  12/25/17 17:56  12/25/17 18:01





  Urinary Pain Relief  PO  12/25/17 17:57  95 mg





  ONETIME ONE   Administration














- Re-Assessments/Exams


Free Text/Narrative Re-Assessment/Exam: 


Ordered by radium 95 mg by mouth. Awaiting results of UA.





12/25/17 18:54 reassessment, patient does take the Pyridium. Went to the 

bathroom still expressing pressure to the suprapubic region. UA was negative 

for infection. Blood present. She does have a recent diagnosis of yeast 

infection is concerned this may be contributing to some of the patient's 

expressing. Highly unlikely since this pain she is currently experiencing is 

chronic and thus prompting evaluation by Dr. Dixon.  Will obtain wet prep. 

No narcotics will be administered. Patient does not appear to be in severe pain.





12/25/17 19:46 Wet Prep: Negative for yeast. Moderate clue cells. Will 

discharge patient home.





12/25/17 20:06 I did order Norco 5-325 2 tabs by mouth while in the ED. Patient 

has a ride. She was instructed not to drive while taking any sedative 

medications. 











Departure





- Departure


Time of Disposition: 19:47


Disposition: Home, Self-Care 01


Condition: Good


Clinical Impression: 


 Chronic suprapubic pain, Endometriosis








- Discharge Information


Instructions:  Endometriosis


Referrals: 


Donavon De León [Primary Care Provider] - 


Forms:  ED Department Discharge


Additional Instructions: 


Continue taking all your home medications as prescribed. UA and wet prep were 

both negative for any concerning findings. See her OB/GYN specialist or PCP for 

further pain management. Return to the ED for any new or worsening symptoms.

## 2018-01-25 NOTE — EDM.PDOC
ED HPI GENERAL MEDICAL PROBLEM





- General


Chief Complaint: Genitourinary Problem


Stated Complaint: RIGHT SIDE FLANK PAIN


Time Seen by Provider: 01/25/18 19:05


Source of Information: Reports: Patient





- History of Present Illness


INITIAL COMMENTS - FREE TEXT/NARRATIVE: 





patient presents with worsening right flank pain that began 2 days ago. She 

states pain has worsened tonight and is radiating to lateral aspect of right 

side. She states she did undergo urodynamic testing done in [place] today, she 

did take tramadol 50mg po earlier this evening. She has longstanding history of 

chronic flank pain, recently diagnosed with interstitial cystitis as well as 

overactive bladder. She did under laparoscopic unilateral oopherectomy and for 

removal of endometriosis a little over 2 weeks ago, performed by Dr Bolton. 

She last saw her primary provider, DR Taylor, about a week ago. She is reporting 

nausea, but no vomiting. She has dysuria, denies any hematuria. Denies any 

fevers/chills. 


Other Treatments PTA: tramadol at 1400


  ** Right Flank


Pain Score (Numeric/FACES): 10





- Related Data


 Allergies











Allergy/AdvReac Type Severity Reaction Status Date / Time


 


codeine Allergy  Rash Verified 01/25/18 18:43


 


morphine Allergy  Hives Verified 01/25/18 18:43


 


ketorolac tromethamine AdvReac  Not good Verified 01/25/18 18:43





[From Toradol]   for her  





   kidneys  


 


lorazepam [From Ativan] AdvReac  Hallucinati Verified 01/25/18 18:43





   ons  


 


metoclopramide HCl AdvReac  Irritabilit Verified 01/25/18 18:43





[From Reglan]   y  











Home Meds: 


 Home Meds





ALPRAZolam [Alprazolam] 0.5 mg PO BEDTIME PRN 11/29/17 [History]


Baclofen 10 mg PO QID PRN 01/25/18 [History]


Estrogen?  01/25/18 [History]


Hydrocodone/Acetaminophen [Hydrocodon-Acetaminoph 7.5-325] 1 each PO Q6HR PRN #

10 tablet 01/25/18 [Rx]


LORazepam [Ativan] 0.5 mg PO QID PRN 01/25/18 [History]


Pentosan Polysulfate Sodium [Elmiron] 100 mg PO DAILY 01/25/18 [History]


Solifenacin [Vesicare] 5 mg PO DAILY 01/25/18 [History]


busPIRone [Buspar] 15 mg PO TID 01/25/18 [History]











Past Medical History





- Past Health History


Medical/Surgical History: Denies Medical/Surgical History


HEENT History: Reports: Impaired Vision


Other HEENT History: glasses and contacts


Gastrointestinal History: Reports: Hemorrhoids


Other Gastrointestinal History: Rectal fissures


Genitourinary History: Reports: Pyelonephritis, UTI, Recurrent


Other Genitourinary History: endometreosis, kidney stones


OB/GYN History: Reports: Endometriosis, Pregnancy


Neurological History: Reports: Migraines


Psychiatric History: Reports: Anxiety


Hematologic History: Reports: Anemia


Oncologic (Cancer) History: Reports: Uterine


Dermatologic History: Reports: Eczema





- Past Surgical History


Female  Surgical History: Reports: Hysterectomy, Kidney stone extraction, 

Ureteral Stent





Social & Family History





- Family History


Family Medical History: Noncontributory





- Tobacco Use


Smoking Status *Q: Former Smoker


Years of Tobacco use: 5


Used Tobacco, but Quit: No


Month Tobacco Last Used: 2


Second Hand Smoke Exposure: Yes





- Caffeine Use


Caffeine Use: Reports: Coffee, Soda





- Alcohol Use


Days Per Week of Alcohol Use: 0





- Recreational Drug Use


Recreational Drug Use: No


Drug Use in Last 12 Months: No





- Living Situation & Occupation


Occupation: Unemployed





ED ROS GENERAL





- Review of Systems


Review Of Systems: See Below


Constitutional: Denies: Fever, Chills


Respiratory: Denies: Shortness of Breath


Cardiovascular: Denies: Chest Pain


GI/Abdominal: Reports: Nausea, Other (last BM was this morning).  Denies: 

Abdominal Pain, Constipation, Diarrhea, Vomiting


: Reports: Dysuria, Flank Pain (right flank pain, has to lie on her left side 

as she can barely touch her right side. ), Pain (pain to right flank, radiating 

to right side. ).  Denies: Discharge, Hematuria


Skin: Denies: Rash


Neurological: Denies: Headache





ED EXAM, GI/ABD





- Physical Exam


Exam: See Below


Exam Limited By: No Limitations


General Appearance: Alert, WD/WN, Moderate Distress


Throat/Mouth: Normal Oropharynx


Head: Atraumatic, Normocephalic


Respiratory/Chest: Lungs Clear, Normal Breath Sounds


Cardiovascular: Regular Rate, Rhythm, No Murmur


GI/Abdominal Exam: Normal Bowel Sounds, Soft, Non-Tender, No Distention


Back Exam: CVA Tenderness (R).  No: CVA Tenderness (L)


Neurological: Alert, Oriented, Normal Cognition


Psychiatric: Normal Affect, Other (very tearful)


Skin Exam: Warm, Dry, Intact





Course





- Vital Signs


Text/Narrative:: 





WBC 11.83,  65% neutrophils, no bands


CMP within normal limits


UA with moderate bacteria, no nitrites, neg leuk est, no occult blood. 0-5 RBC. 

Urine culture was added and is pending. 


patient notified of findings, advised I did not feel CT was necessary to 

further evaluate for kidney stone. 





She states pain has improved, but requesting something prior to discharge. Will 

be given a second dose of dilaudid .5 IM


Patient is very concerned that CT is not being considered for renal stone. 

Discussed empiric treatment with flomax and pain control, and continue to 

strain urine. She states she has flomax at home from a previous rx. 


Plan to discharge home and f/u with PCP or return to ED with worsening 

symptoms. 


Last Recorded V/S: 


 Last Vital Signs











Temp  98.0 F   01/25/18 18:43


 


Pulse  115 H  01/25/18 18:43


 


Resp  20   01/25/18 18:43


 


BP  128/100 H  01/25/18 18:43


 


Pulse Ox  99   01/25/18 18:43














- Orders/Labs/Meds


Labs: 


 Laboratory Tests











  01/25/18 01/25/18 01/25/18 Range/Units





  18:18 19:40 19:40 


 


WBC   11.83 H   (3.98-10.04)  K/mm3


 


RBC   4.26   (3.98-5.22)  M/mm3


 


Hgb   13.1   (11.2-15.7)  gm/L


 


Hct   38.9   (34.1-44.9)  %


 


MCV   91.3   (79.4-94.8)  fl


 


MCH   30.8   (25.6-32.2)  pg


 


MCHC   33.7   (32.2-35.5)  g/dl


 


RDW Std Deviation   38.8   (36.4-46.3)  fL


 


Plt Count   351   (182-369)  K/mm3


 


MPV   9.6   (9.4-12.3)  fl


 


Neutrophils % (Manual)   65 H   (40-60)  %


 


Band Neutrophils %   0   (0-10)  %


 


Lymphocytes % (Manual)   27   (20-40)  %


 


Atypical Lymphs %   0   %


 


Monocytes % (Manual)   7   (2-10)  %


 


Eosinophils % (Manual)   0 L   (0.7-5.8)  %


 


Basophils % (Manual)   1   (0.1-1.2)  


 


Platelet Estimate   Adequate   


 


Plt Morphology Comment   Normal   


 


RBC Morph Comment   Normal   


 


Sodium    142  (136-145)  mEq/L


 


Potassium    3.7  (3.5-5.1)  mEq/L


 


Chloride    106  ()  mEq/L


 


Carbon Dioxide    28  (21-32)  mEq/L


 


Anion Gap    11.7  (5-15)  


 


BUN    10  (7-18)  mg/dL


 


Creatinine    0.7  (0.55-1.02)  mg/dL


 


Est Cr Clr Drug Dosing    98.09  mL/min


 


Estimated GFR (MDRD)    > 60  (>60)  mL/min


 


BUN/Creatinine Ratio    14.3  (14-18)  


 


Glucose    76  ()  mg/dL


 


Calcium    8.9  (8.5-10.1)  mg/dL


 


Total Bilirubin    0.3  (0.2-1.0)  mg/dL


 


AST    12 L  (15-37)  U/L


 


ALT    27  (14-59)  U/L


 


Alkaline Phosphatase    55  ()  U/L


 


C-Reactive Protein    < 0.2  (<1.0)  mg/dL


 


Total Protein    7.5  (6.4-8.2)  g/dl


 


Albumin    3.8  (3.4-5.0)  g/dl


 


Globulin    3.7  gm/dL


 


Albumin/Globulin Ratio    1.0  (1-2)  


 


Urine Color  Yellow    (Yellow)  


 


Urine Appearance  Clear    (Clear)  


 


Urine pH  7.0    (5.0-8.0)  


 


Ur Specific Gravity  1.015    (1.005-1.030)  


 


Urine Protein  Negative    (Negative)  


 


Urine Glucose (UA)  Negative    (Negative)  


 


Urine Ketones  Negative    (Negative)  


 


Urine Occult Blood  Negative    (Negative)  


 


Urine Nitrite  Negative    (Negative)  


 


Urine Bilirubin  Negative    (Negative)  


 


Urine Urobilinogen  0.2    (0.2-1.0)  


 


Ur Leukocyte Esterase  Negative    (Negative)  


 


Urine RBC  0-5    (0-5)  /hpf


 


Urine WBC  0-5    (0-5)  /hpf


 


Ur Epithelial Cells  5-10 H    (0-5)  /hpf


 


Urine Bacteria  Moderate H    (FEW)  /hpf


 


Urine Mucus  Not seen    (FEW)  /hpf











Meds: 


Medications














Discontinued Medications














Generic Name Dose Route Start Last Admin





  Trade Name Freq  PRN Reason Stop Dose Admin


 


Diphenhydramine HCl  25 mg  01/25/18 19:28  01/25/18 19:39





  Benadryl  IVPUSH  01/25/18 19:29  25 mg





  ONETIME ONE   Administration


 


Hydromorphone HCl  1 mg  01/25/18 19:27  01/25/18 19:38





  Dilaudid  IVPUSH  01/25/18 19:28  1 mg





  ONETIME ONE   Administration


 


Hydromorphone HCl  0.5 mg  01/25/18 20:43  01/25/18 20:54





  Dilaudid  IVPUSH  01/25/18 20:44  0.5 mg





  ONETIME ONE   Administration


 


Sodium Chloride  1,000 mls @ 999 mls/hr  01/25/18 19:28  01/25/18 19:38





  Normal Saline  IV  01/25/18 20:28  999 mls/hr





  ONETIME ONE   Administration


 


Ondansetron HCl  4 mg  01/25/18 19:27  01/25/18 19:41





  Zofran  IVPUSH  01/25/18 19:28  4 mg





  ONETIME ONE   Administration














Departure





- Departure


Time of Disposition: 21:06


Disposition: Home, Self-Care 01


Condition: Good


Clinical Impression: 


 Right flank pain, Kidney stone








- Discharge Information


Prescriptions: 


Hydrocodone/Acetaminophen [Hydrocodon-Acetaminoph 7.5-325] 1 each PO Q6HR PRN #

10 tablet


 PRN Reason: Pain


Instructions:  Kidney Stones, Easy-to-Read, Pain Medicine Instructions, Easy-to-

Read


Referrals: 


Nolan Taylor MD [Primary Care Provider] - 


Forms:  ED Department Discharge


Additional Instructions: 


You stated you have flomax at home from a previous prescription, I recommend 

taking as directed. Use in addition to pain medication given to you at time of 

discharge from the ED. Your Urinalysis did not reveal any RBC, there was 

moderate bacteria so a urine culture is pending. Increase fluids and rest. 

Monitor symptoms, and do not hesitate to return to ED with worsening symptoms. 

Otherwise, recommend f/u with your PCP to discuss further management of flank 

pain. You were given a strong pain medication in the ED tonight, so I do not 

recommend driving, or operating any equipment for at least 10 hours. Do not use 

any other pain medication you have for at least 4-6 hours.

## 2018-04-12 NOTE — CT
CT abdomen and pelvis

 

Technique: Multiple axial sections were obtained from above the dome 

of the diaphragm inferiorly through the pubic symphysis.  Intravenous 

and oral contrast was not utilized.  Study has been performed as a 

ureteral stone protocol.

 

Comparison: Previous renal ultrasound performed earlier on the same 

day (4:04 PM) and prior CT abdomen and pelvis study of 10/22/17.

 

Findings: No ureteral dilatation is seen.  No abnormal calcifications 

are seen along the course of the ureters.  The mild hydronephrosis 

noted on previous ultrasound within the right kidney is not 

appreciated on this CT exam and was likely transient and incidental.

 

Visualized lung bases are clear.  Noncontrast appearance of the liver 

and spleen appear within normal limits.  Gallbladder contains no 

calcified gallstones.  Adrenal glands show no nodule.  Pancreas shows 

no discrete abnormality.  Aorta shows no aneurysmal dilatation.  

Appendix is seen and appears normal in size.  No pelvic mass or 

adenopathy is seen.  No free fluid or inflammatory change is seen.

 

Bone window settings were reviewed which appear within normal limits 

for the patient's age.

 

Impression:

1.  No renal calculi, ureteral dilatation or ureteral stone is seen.

2.  Other portions of the noncontrast CT study also appear 

unremarkable.  Nothing acute is appreciated.

 

Diagnostic code #1

## 2018-04-12 NOTE — US
Renal ultrasound: Multiple real-time images of the kidneys were 

obtained.

 

Mild hydronephrosis is noted of the right kidney.  Left kidney shows 

no hydronephrosis.  No shadowing calculi seen within the kidneys.  

Resistivity indices are normal within both kidneys.  Bilateral 

ureteral jets are seen within the bladder.  Bladder empties completely

 on post void exam.

 

Measurements:

Right kidney length: 11.5 cm

Left kidney 9:10.3 cm

 

Impression:

1.  Mild hydronephrosis of the right kidney.  Etiology not visualized 

on this study.

2.  Bilateral ureteral jets are seen which rules out a complete 

ureteral obstruction.

 

Diagnostic code #3

## 2018-04-12 NOTE — EDM.PDOC
ED HPI GENERAL MEDICAL PROBLEM





- General


Chief Complaint: Genitourinary Problem


Stated Complaint: PELVIC PAIN/TROUBLE URINATING


Time Seen by Provider: 04/12/18 14:15


Source of Information: Reports: Patient, Old Records


History Limitations: Reports: No Limitations





- History of Present Illness


INITIAL COMMENTS - FREE TEXT/NARRATIVE: 





27-year-old female presents for evaluation and treatment of right-sided back 

pain. Patient reports that her symptoms started 3 or 4 days ago. States the 

pain is located on the right side of her back. Has been a constant pain. She 

has taken tramadol which she has at home that this has not helped pain.  she 

also reports dysuria. No fevers, chills, nausea, vomiting, abdominal pain or 

any hematuria. She denies any vaginal discharge.





Patient has a complicated past medical history. Past medical history notable 

for endometriosi and interstitial cystitis. She had surgery with Dr. Dixon 

in [place] in December. She states that her left ovary was embedded in her 

bowel wall. She was also diagnosed with interstitial cystitis. She is currently 

in physical therapy for her chronic pelvic pain. She is also on Vesicare, 

baclofen, elmiron and tramadol. 





Primary care provider in [place] is Dr. Carlos.


Treatments PTA: Reports: Other (see below)


Other Treatments PTA: tramadol daily


  ** Pelvic


Pain Score (Numeric/FACES): 10





- Related Data


 Allergies











Allergy/AdvReac Type Severity Reaction Status Date / Time


 


codeine Allergy  Rash Verified 01/25/18 18:43


 


morphine Allergy  Hives Verified 01/25/18 18:43


 


ketorolac tromethamine AdvReac  Not good Verified 01/25/18 18:43





[From Toradol]   for her  





   kidneys  


 


lorazepam [From Ativan] AdvReac  Hallucinati Verified 01/25/18 18:43





   ons  


 


metoclopramide HCl AdvReac  Irritabilit Verified 01/25/18 18:43





[From Reglan]   y  











Home Meds: 


 Home Meds





Baclofen 20 mg PO TID PRN 01/25/18 [History]


LORazepam [Ativan] 1 mg PO BID PRN 01/25/18 [History]


Pentosan Polysulfate Sodium [Elmiron] 100 mg PO DAILY 01/25/18 [History]


Solifenacin [Vesicare] 10 mg PO DAILY 01/25/18 [History]


busPIRone [Buspar] 15 mg PO TID 01/25/18 [History]


Citalopram Hydrobromide [Celexa] 20 mg PO DAILY 04/12/18 [History]


Gabapentin [Neurontin] 100 mg PO TID 04/12/18 [History]


Topiramate 25 mg PO BID 04/12/18 [History]


traMADol [Ultram ER] 100 mg PO BID 04/12/18 [History]











Past Medical History





- Past Health History


Medical/Surgical History: Denies Medical/Surgical History


HEENT History: Reports: Impaired Vision


Other HEENT History: glasses and contacts


Gastrointestinal History: Reports: Hemorrhoids


Other Gastrointestinal History: Rectal fissures


Genitourinary History: Reports: Pyelonephritis, Renal Calculus, UTI, Recurrent


Other Genitourinary History: endometreosis, kidney stones


OB/GYN History: Reports: Endometriosis, Pregnancy, Other (See Below)


Other OB/BYN History: left ovary removed and bowel surgery due to ovary embedded


Neurological History: Reports: Migraines


Psychiatric History: Reports: Anxiety


Hematologic History: Reports: Anemia


Oncologic (Cancer) History: Reports: Uterine


Dermatologic History: Reports: Eczema





- Past Surgical History


Female  Surgical History: Reports: Hysterectomy, Kidney stone extraction, 

Ureteral Stent





Social & Family History





- Family History


Family Medical History: Noncontributory





- Tobacco Use


Smoking Status *Q: Current Every Day Smoker


Years of Tobacco use: 5


Packs/Tins Daily: 0.2


Used Tobacco, but Quit: No


Month/Year Tobacco Last Used: 2


Second Hand Smoke Exposure: Yes





- Caffeine Use


Caffeine Use: Reports: None





- Alcohol Use


Days Per Week of Alcohol Use: 0





- Recreational Drug Use


Recreational Drug Use: No


Drug Use in Last 12 Months: No





- Living Situation & Occupation


Occupation: Unemployed





ED ROS GENERAL





- Review of Systems


Review Of Systems: See Below


Constitutional: Denies: Fever


GI/Abdominal: Denies: Abdominal Pain, Nausea, Vomiting


: Reports: Dysuria, Pain (pelvic)


Musculoskeletal: Reports: Back Pain (right back)





ED EXAM, RENAL/





- Physical Exam


Exam: See Below


Exam Limited By: No Limitations


General Appearance: Alert, WD/WN, No Apparent Distress


Respiratory/Chest: No Respiratory Distress, Lungs Clear, Normal Breath Sounds


Cardiovascular: Normal Peripheral Pulses, Regular Rate, Rhythm, No Murmur


GI/Abdominal: Normal Bowel Sounds, Soft, Non-Tender


Back Exam: Normal Inspection, CVA Tenderness (R)


Neurological: Alert, Oriented, Normal Cognition


Psychiatric: Normal Affect, Normal Mood


Skin Exam: Warm, Dry, Normal Color





Course





- Vital Signs


Last Recorded V/S: 


 Last Vital Signs











Temp  36.9 C   04/12/18 13:33


 


Pulse  78   04/12/18 18:25


 


Resp  18   04/12/18 18:25


 


BP  120/84   04/12/18 18:25


 


Pulse Ox  100   04/12/18 18:25














- Orders/Labs/Meds


Orders: 


 Active Orders 24 hr











 Category Date Time Status


 


 DRUG SCREEN, URINE [URCHEM] Stat Lab  04/12/18 14:08 Ordered


 


 UA W/MICROSCOPIC [URIN] Stat Lab  04/12/18 14:08 Ordered











Labs: 


 Laboratory Tests











  04/12/18 04/12/18 04/12/18 Range/Units





  14:08 14:08 14:44 


 


WBC    5.70  (3.98-10.04)  K/mm3


 


RBC    4.60  (3.98-5.22)  M/mm3


 


Hgb    14.1  (11.2-15.7)  gm/L


 


Hct    41.7  (34.1-44.9)  %


 


MCV    90.7  (79.4-94.8)  fl


 


MCH    30.7  (25.6-32.2)  pg


 


MCHC    33.8  (32.2-35.5)  g/dl


 


RDW Std Deviation    40.3  (36.4-46.3)  fL


 


Plt Count    224  (182-369)  K/mm3


 


MPV    10.8  (9.4-12.3)  fl


 


Neutrophils % (Manual)    73 H  (40-60)  %


 


Band Neutrophils %    0  (0-10)  %


 


Lymphocytes % (Manual)    23  (20-40)  %


 


Atypical Lymphs %    0  %


 


Monocytes % (Manual)    4  (2-10)  %


 


Eosinophils % (Manual)    0 L  (0.7-5.8)  %


 


Basophils % (Manual)    0 L  (0.1-1.2)  


 


Platelet Estimate    Adequate  


 


Anisocytosis    1+ slight  


 


RBC Morph Comment    Abnormal  


 


Sodium     (136-145)  mEq/L


 


Potassium     (3.5-5.1)  mEq/L


 


Chloride     ()  mEq/L


 


Carbon Dioxide     (21-32)  mEq/L


 


Anion Gap     (5-15)  


 


BUN     (7-18)  mg/dL


 


Creatinine     (0.55-1.02)  mg/dL


 


Est Cr Clr Drug Dosing     mL/min


 


Estimated GFR (MDRD)     (>60)  mL/min


 


BUN/Creatinine Ratio     (14-18)  


 


Glucose     ()  mg/dL


 


Calcium     (8.5-10.1)  mg/dL


 


Total Bilirubin     (0.2-1.0)  mg/dL


 


AST     (15-37)  U/L


 


ALT     (14-59)  U/L


 


Alkaline Phosphatase     ()  U/L


 


C-Reactive Protein     (<1.0)  mg/dL


 


Total Protein     (6.4-8.2)  g/dl


 


Albumin     (3.4-5.0)  g/dl


 


Globulin     gm/dL


 


Albumin/Globulin Ratio     (1-2)  


 


Urine Color  Light yellow    (Yellow)  


 


Urine Appearance  Clear    (Clear)  


 


Urine pH  7.0    (5.0-8.0)  


 


Ur Specific Gravity  1.020    (1.005-1.030)  


 


Urine Protein  Negative    (Negative)  


 


Urine Glucose (UA)  Negative    (Negative)  


 


Urine Ketones  Negative    (Negative)  


 


Urine Occult Blood  Negative    (Negative)  


 


Urine Nitrite  Negative    (Negative)  


 


Urine Bilirubin  Negative    (Negative)  


 


Urine Urobilinogen  0.2    (0.2-1.0)  


 


Ur Leukocyte Esterase  Negative    (Negative)  


 


Urine RBC  Not seen    (0-5)  /hpf


 


Urine WBC  0-5    (0-5)  /hpf


 


Ur Epithelial Cells  10-20 H    (0-5)  /hpf


 


Urine Bacteria  Few    (FEW)  /hpf


 


Urine Mucus  Not seen    (FEW)  /hpf


 


Urine Opiates Screen   Negative   (NEGATIVE)  


 


Ur Buprenorphine Scrn   Negative   (NEGATIVE)  


 


Ur Oxycodone Screen   Negative   (NEGATIVE)  


 


Urine Methadone Screen   Negative   (NEGATIVE)  


 


Ur Propoxyphene Screen   Negative   (NEGATIVE)  


 


Ur Barbiturates Screen   Negative   (NEGATIVE)  


 


Ur Tricyclics Screen   Negative   (NEGATIVE)  


 


Ur Phencyclidine Scrn   Negative   (NEGATIVE)  


 


Ur Amphetamine Screen   Negative   (NEGATIVE)  


 


U Methamphetamines Scrn   Negative   (NEGATIVE)  


 


U Benzodiazepines Scrn   Presumptive positive H   (NEGATIVE)  


 


U Cocaine Metab Screen   Negative   (NEGATIVE)  


 


U Marijuana (THC) Screen   Presumptive positive H   (NEGATIVE)  














  04/12/18 Range/Units





  14:44 


 


WBC   (3.98-10.04)  K/mm3


 


RBC   (3.98-5.22)  M/mm3


 


Hgb   (11.2-15.7)  gm/L


 


Hct   (34.1-44.9)  %


 


MCV   (79.4-94.8)  fl


 


MCH   (25.6-32.2)  pg


 


MCHC   (32.2-35.5)  g/dl


 


RDW Std Deviation   (36.4-46.3)  fL


 


Plt Count   (182-369)  K/mm3


 


MPV   (9.4-12.3)  fl


 


Neutrophils % (Manual)   (40-60)  %


 


Band Neutrophils %   (0-10)  %


 


Lymphocytes % (Manual)   (20-40)  %


 


Atypical Lymphs %   %


 


Monocytes % (Manual)   (2-10)  %


 


Eosinophils % (Manual)   (0.7-5.8)  %


 


Basophils % (Manual)   (0.1-1.2)  


 


Platelet Estimate   


 


Anisocytosis   


 


RBC Morph Comment   


 


Sodium  142  (136-145)  mEq/L


 


Potassium  3.7  (3.5-5.1)  mEq/L


 


Chloride  107  ()  mEq/L


 


Carbon Dioxide  24  (21-32)  mEq/L


 


Anion Gap  14.7  (5-15)  


 


BUN  10  (7-18)  mg/dL


 


Creatinine  0.8  (0.55-1.02)  mg/dL


 


Est Cr Clr Drug Dosing  85.83  mL/min


 


Estimated GFR (MDRD)  > 60  (>60)  mL/min


 


BUN/Creatinine Ratio  12.5 L  (14-18)  


 


Glucose  88  ()  mg/dL


 


Calcium  8.8  (8.5-10.1)  mg/dL


 


Total Bilirubin  0.4  (0.2-1.0)  mg/dL


 


AST  12 L  (15-37)  U/L


 


ALT  15  (14-59)  U/L


 


Alkaline Phosphatase  50  ()  U/L


 


C-Reactive Protein  < 0.2  (<1.0)  mg/dL


 


Total Protein  7.2  (6.4-8.2)  g/dl


 


Albumin  4.1  (3.4-5.0)  g/dl


 


Globulin  3.1  gm/dL


 


Albumin/Globulin Ratio  1.3  (1-2)  


 


Urine Color   (Yellow)  


 


Urine Appearance   (Clear)  


 


Urine pH   (5.0-8.0)  


 


Ur Specific Gravity   (1.005-1.030)  


 


Urine Protein   (Negative)  


 


Urine Glucose (UA)   (Negative)  


 


Urine Ketones   (Negative)  


 


Urine Occult Blood   (Negative)  


 


Urine Nitrite   (Negative)  


 


Urine Bilirubin   (Negative)  


 


Urine Urobilinogen   (0.2-1.0)  


 


Ur Leukocyte Esterase   (Negative)  


 


Urine RBC   (0-5)  /hpf


 


Urine WBC   (0-5)  /hpf


 


Ur Epithelial Cells   (0-5)  /hpf


 


Urine Bacteria   (FEW)  /hpf


 


Urine Mucus   (FEW)  /hpf


 


Urine Opiates Screen   (NEGATIVE)  


 


Ur Buprenorphine Scrn   (NEGATIVE)  


 


Ur Oxycodone Screen   (NEGATIVE)  


 


Urine Methadone Screen   (NEGATIVE)  


 


Ur Propoxyphene Screen   (NEGATIVE)  


 


Ur Barbiturates Screen   (NEGATIVE)  


 


Ur Tricyclics Screen   (NEGATIVE)  


 


Ur Phencyclidine Scrn   (NEGATIVE)  


 


Ur Amphetamine Screen   (NEGATIVE)  


 


U Methamphetamines Scrn   (NEGATIVE)  


 


U Benzodiazepines Scrn   (NEGATIVE)  


 


U Cocaine Metab Screen   (NEGATIVE)  


 


U Marijuana (THC) Screen   (NEGATIVE)  











Meds: 


Medications














Discontinued Medications














Generic Name Dose Route Start Last Admin





  Trade Name Freq  PRN Reason Stop Dose Admin


 


Hydromorphone HCl  0.5 mg  04/12/18 17:20  04/12/18 18:23





  Dilaudid  IM  04/12/18 17:21  0.5 mg





  ONETIME ONE   Administration





     





     





     





     


 


Ketorolac Tromethamine  60 mg  04/12/18 14:36  04/12/18 18:17





  Toradol  IM  04/12/18 14:37  Not Given





  ONETIME ONE   





     





     





     





     


 


Phenazopyridine HCl  95 mg  04/12/18 14:36  04/12/18 18:17





  Urinary Pain Relief  PO  04/12/18 14:37  Not Given





  NOW STA   





     





     





     





     


 


Promethazine HCl  25 mg  04/12/18 14:36  04/12/18 14:56





  Phenergan  IM  04/12/18 14:37  25 mg





  ONETIME ONE   Administration





     





     





     





     














- Radiology Interpretation


Free Text/Narrative:: 





Renal ultrasound: Multiple real-time images of the kidneys were obtained.


Mild hydronephrosis is noted of the right kidney.  Left kidney shows no 

hydronephrosis.  No shadowing calculi seen within the kidneys.  Resistivity 

indices are normal within both kidneys.  Bilateral ureteral jets are seen 

within the bladder.  Bladder empties completely on post void exam.


Measurements:


Right kidney length: 11.5 cm


Left kidney 9:10.3 cm


ion:


1.  Mild hydronephrosis of the right kidney.  Etiology not visualized on this 

study.


2.  Bilateral ureteral jets are seen which rules out a complete ureteral 

obstruction.





CT abdomen and pelvis


Technique: Multiple axial sections were obtained from above the dome of the 

diaphragm inferiorly through the pubic symphysis.  Intravenous and oral 

contrast was not utilized.  Study has been performed as a ureteral stone 

protocol.


Comparison: Previous renal ultrasound performed earlier on the same day (4:04 PM

) and prior CT abdomen and pelvis study of 10/22/17.


Findings: No ureteral dilatation is seen.  No abnormal calcifications are seen 

along the course of the ureters.  The mild hydronephrosis noted on previous 

ultrasound within the right kidney is not appreciated on this CT exam and was 

likely transient and incidental.


Visualized lung bases are clear.  Noncontrast appearance of the liver and 

spleen appear within normal limits.  Gallbladder contains no calcified 

gallstones.  Adrenal glands show no nodule.  Pancreas shows no discrete 

abnormality.  Aorta shows no aneurysmal dilatation.  Appendix is seen and 

appears normal in size.  No pelvic mass or adenopathy is seen.  No free fluid 

or inflammatory change is seen.


Bone window settings were reviewed which appear within normal limits for the 

patient's age.


Impression:


1.  No renal calculi, ureteral dilatation or ureteral stone is seen.


2.  Other portions of the noncontrast CT study also appear unremarkable.  

Nothing acute is appreciated.





- Re-Assessments/Exams


Free Text/Narrative Re-Assessment/Exam: 





04/12/18 15:00


Patient was searched on the  [place] prescription drug registry. She has 

received 30 prescriptions from Different providers for controlled substances 

within the last year.





I discussed the patient's case with her primary care provide, Dr. Lawson. He 

also  does have some concerns as she does have a history of substance abuse. He 

recommended giving her a shot of Toradol. At the time I discussed the case with 

him I only had her urine back which did not show any blood, leukocytes or 

nitrates.





04/12/18 15:45


I reviewed the labs with the patient. She declined  the PO Azo and the Toradol. 

States that she has an allergy to toradol of being hard on her kidneys. I 

informed her that her kidney function is fine today and there is no reason that 

she cannot take any Toradol. She also reports that she has hives and a rash to 

Toradol. I offered her some Benadryl in addition to the Toradol but she refuses 

the Toradol and the Azo. She did take the Phenergan.





I reviewed the labs with the patient. Given her normal vi and her normal labs 

today to do for this is exacerbation of her chronic pain. Unfortunately, we do 

not treat chronic pain here in the ER. She is in her should follow up with OB 

her primary care provider for further pain management. She is adamant that 

something more is wrong. She now states that she has pain of the right severe 

back radiating to her right lower abdomen and right groin. It is possible that 

she does have a kidney stone. Of note she did not have any microscopic 

hematuria. She has had kidney stones in the past. I reviewed her records. She 

has had multiple CT scans in the past.





Case discussed with Dr. Khan, ER physician on-call. He recommended doing an 

ultrasound to go for hydronephrosis due to her multiple CT scans in the past.





04/12/18 17:25 


Ultrasound shows mild hydronephrosis. Cannot rule out as kidney stone at this 

time. I ordered Her some IM Dilaudid as she is continuing to refuse the Toradol 

despite my offering Benadryl with the Toradol. I did inform her that Toradol is 

a good medication for ureteral spasm if she does have a stone. She refuses.





IM Dilaudid ordered for pain.





04/12/18 18:14


CT results returned with no signs of any stone. The hydronephrosis is 

appreciated on ultrasound was not appreciated on CT and felt to be transient. 

No ureteral dilation appreciated.





I do feel this is exacerbation of her chronic pain. I informed her that she 

should follow up with OB and Dr. Rhodes for further pain management. She may be 

required to see pain management if she continues to have significant pain. She 

states that she was told by her OB/GYN provider to come here tonight for pain 

control. I informed her that is my job to evaluate for emergencies which we 

ruled out tonight but is not my job to prescribe medications for chronic pain.





We will discharge her home tonight.





Discharge instructions as documented.





Departure





- Departure


Time of Disposition: 18:21


Disposition: Home, Self-Care 01


Condition: Fair


Clinical Impression: 


 Cystitis, Endometriosis, Chronic abdominal pain








- Discharge Information


Instructions:  Abdominal Pain, Adult, Interstitial Cystitis, Endometriosis


Referrals: 


Nolan Driscoll MD [Primary Care Provider] - 


Forms:  ED Department Discharge


Additional Instructions: 


Continue with your current plan of care. Follow-up with your OB/GYN or primary 

care provider for further pain management.





You were given medication in the ER t hat can affect your ability to drive and 

operate machinery. Do not drive or operate machinery within 12 hours of taking 

prescription narcotic pain medication. 





Please return to the ER if your symptoms change or worsen.





- My Orders


Last 24 Hours: 


My Active Orders





04/12/18 14:08


DRUG SCREEN, URINE [URCHEM] Stat 


UA W/MICROSCOPIC [URIN] Stat 














- Assessment/Plan


Last 24 Hours: 


My Active Orders





04/12/18 14:08


DRUG SCREEN, URINE [URCHEM] Stat 


UA W/MICROSCOPIC [URIN] Stat

## 2018-06-21 NOTE — EDM.PDOC
ED HPI GENERAL MEDICAL PROBLEM





- General


Chief Complaint: General


Stated Complaint: BOWEL ISSUES


Time Seen by Provider: 18 21:05


Source of Information: Reports: Patient


History Limitations: Reports: No Limitations





- History of Present Illness


INITIAL COMMENTS - FREE TEXT/NARRATIVE: 





27-year-old female presents to the ED with diffuse abdominal pain primarily 

cramping intermittent abdominal pain. This been going on for the last 11 days. 

Patient has a complex past history of multiple abdominal surgeries for 

primarily endometriosis. Last surgery was in January of this year but no 

endometriosis was identified. Apparently left ovary was in bad shape and had to 

be resected completely. She still has a retained right ovary. Uterus and 

fallopian tubes have been previously removed. She has one of the worst cases of 

endometriosis that Dr. [facility] in  [place] has  ever seen. This year 

she was diagnosed with likely interstitial cystitis. She is on multiple 

medications for this and believes it is slowly coming under better control. 

Over the last 11 days she's been losing control of her bowels with sudden spasm 

of the lower rectum and discharge of stool into her underclothes. She is 

currently wearing a depends. It hurts to wipe at the anus. She has a history of 

previous anal fissures but not has not noticed any blood in the stool per se. 

Loss of the stool but his past is watery. She's been started on Imodium but it 

has not helped at all. She comes to the ED seeking pain management primarily. 

She feels slightly distended diffuse intermittent abdominal cramping pain with 

nausea. Lives on a very restricted diet and water only per ora.


Onset: Gradual


Onset Date: 06/10/18


Duration: Day(s):, Getting Worse, Intermittent, Waxing/Waning


Location: Reports: Abdomen (Diffuse intermittent abdominal cramping pain with 

spontaneous loss of bowel control. Sense of urgency and then sometimes can't 

make it to the bathroom in time for bowels moved. This suggests inflammation of 

the lower part of the colon.)


Quality: Reports: Ache (Cramping), Sharp, Stabbing, Other


Severity: Moderate


Improves with: Reports: None


Worsens with: Reports: Eating


Context: Denies: Activity, Exercise, Lifting, Sick Contact, Trauma, Other


Associated Symptoms: Reports: Loss of Appetite, Malaise, Nausea/Vomiting, 

Weakness (Nausea without vomiting).  Denies: No Other Symptoms, Confusion, 

Chest Pain, Cough, cough w sputum, Diaphoresis, Fever/Chills, Headaches, Rash, 

Seizure, Shortness of Breath, Syncope


Treatments PTA: Reports: Other (see below) (Plenty of medications including 

baclofen 20 mg 3 times a day)


  ** Lower Abdomen


Pain Score (Numeric/FACES): 8





- Related Data


 Allergies











Allergy/AdvReac Type Severity Reaction Status Date / Time


 


codeine Allergy  Rash Verified 18 20:58


 


morphine Allergy  Hives Verified 18 20:58


 


ketorolac tromethamine AdvReac  Rash Verified 18 20:58





[From Toradol]     


 


lorazepam [From Ativan] AdvReac  Hallucinati Verified 18 20:58





   ons  


 


metoclopramide HCl AdvReac  Irritabilit Verified 18 20:58





[From Reglan]   y  











Home Meds: 


 Home Meds





Baclofen 20 mg PO TID PRN 18 [History]


LORazepam [Ativan] 1 mg PO BID PRN 18 [History]


Pentosan Polysulfate Sodium [Elmiron] 100 mg PO DAILY 18 [History]


Solifenacin [Vesicare] 10 mg PO DAILY 18 [History]


busPIRone [Buspar] 15 mg PO TID 18 [History]


Citalopram Hydrobromide [Celexa] 20 mg PO DAILY 18 [History]


Gabapentin [Neurontin] 100 mg PO TID 18 [History]


Topiramate 25 mg PO BID 18 [History]


traMADol [Ultram ER] 100 mg PO BID 18 [History]


Dicyclomine [Bentyl] 20 mg PO Q6H PRN #20 tablet 18 [Rx]


Potassium Chloride 20 meq PO BID #10 tablet.er 18 [Rx]


oxyCODONE HCl/Acetaminophen [Percocet 5-325 mg Tablet] 1 - 2 each PO Q4H PRN #

20 tablet 18 [Rx]











Past Medical History





- Past Health History


Medical/Surgical History: Denies Medical/Surgical History


HEENT History: Reports: Impaired Vision


Other HEENT History: glasses and contacts


Gastrointestinal History: Reports: Hemorrhoids


Other Gastrointestinal History: Rectal fissures


Genitourinary History: Reports: Renal Calculus, Other (See Below) (Interstitial 

cystitis)


Other Genitourinary History: endometreosis, kidney stones


OB/GYN History: Reports: Endometriosis, Pregnancy


Other OB/BYN History: left ovary removed and bowel surgery due to ovary embedded


Neurological History: Reports: Migraines


Psychiatric History: Reports: Anxiety, Depression


Hematologic History: Reports: Anemia


Oncologic (Cancer) History: Reports: Uterine


Dermatologic History: Reports: Eczema





- Past Surgical History


HEENT Surgical History: Reports: Oral Surgery ([place] teeth extraction)


GI Surgical History: Reports: Lysis of Adhesions (x 4)


Female  Surgical History: Reports:  Section (x 1), Hysterectomy, 

Kidney stone extraction, Oophorectomy (left), Ureteral Stent (right)





Social & Family History





- Family History


Family Medical History: Noncontributory





- Caffeine Use


Caffeine Use: Reports: None





- Living Situation & Occupation


Living situation: Reports: , with Spouse, with Family (3 kids)


Occupation: Unemployed





ED ROS GENERAL





- Review of Systems


Review Of Systems: See Below


Constitutional: Reports: Malaise, Fatigue, Decreased Appetite.  Denies: Fever, 

Chills


HEENT: Reports: No Symptoms


Respiratory: Reports: No Symptoms


Cardiovascular: Reports: No Symptoms


Endocrine: Reports: Fatigue


GI/Abdominal: Reports: Abdominal Pain, Decreased Appetite (See history of 

present illness), Other (Spontaneous loss of bowel control sometimes up to 8-10 

stools per day spontaneously.)


: Reports: Other (Diagnosed with interstitial cystitis 6 months ago.)


Musculoskeletal: Reports: No Symptoms


Skin: Reports: No Symptoms


Neurological: Reports: No Symptoms


Psychiatric: Reports: No Symptoms


Hematologic/Lymphatic: Reports: No Symptoms





ED EXAM, GENERAL





- Physical Exam


Exam: See Below


Exam Limited By: No Limitations


General Appearance: Alert, WD/WN, No Apparent Distress, Other (Mild pallor)


Eye Exam: Bilateral Eye: Normal Inspection (Mild pallor.)


Throat/Mouth: Normal Inspection, Normal Lips, Normal Oropharynx


Respiratory/Chest: No Respiratory Distress, Lungs Clear, Normal Breath Sounds, 

No Accessory Muscle Use


Cardiovascular: Normal Peripheral Pulses, Regular Rate, Rhythm, No Edema, No 

Gallop, No Murmur


Peripheral Pulses: 3+: Posterior Tibial (L), Posterior Tibial (R), Dorsalis 

Pedis (L), Dorsalis Pedis (R)


GI/Abdominal: Distended (Mildly increased bowel sounds highly diffusely 

distended in all 4 quadrants and tympanitic to percussion.), Tender (Heaviness 

mostly suprapubically.), Abnormal Bowel Sounds.  No: Guarding, Rigid, Rebound


Rectal (Female) Exam: Other (No recurrent hemorrhoids. There is evidence of old 

healed anal fissures.)


Back Exam: Normal Inspection ( There is mild inflammation at the 12 o'clock 

position.), Full Range of Motion.  No: CVA Tenderness (L), CVA Tenderness (R)


Extremities: Normal Inspection, Normal Range of Motion, Non-Tender, No Pedal 

Edema


Neurological: Alert, Oriented, CN II-XII Intact, Normal Cognition


Psychiatric: Normal Affect, Normal Mood


Skin Exam: Warm, Dry, Intact, No Rash, Pallor





Course





- Vital Signs


Last Recorded V/S: 


 Last Vital Signs











Temp  36.6 C   18 20:49


 


Pulse  100   18 20:49


 


Resp  16   18 20:49


 


BP  128/92 H  18 20:49


 


Pulse Ox  100   18 20:49














- Orders/Labs/Meds


Orders: 


 Active Orders 24 hr











 Category Date Time Status


 


 Abdomen 1V Flat [CR] Stat Exams  18 21:26 Taken











Labs: 


 Laboratory Tests











  18 Range/Units





  22:15 22:15 


 


WBC  12.07 H   (3.98-10.04)  K/mm3


 


RBC  4.68   (3.98-5.22)  M/mm3


 


Hgb  14.4   (11.2-15.7)  gm/L


 


Hct  41.8   (34.1-44.9)  %


 


MCV  89.3   (79.4-94.8)  fl


 


MCH  30.8   (25.6-32.2)  pg


 


MCHC  34.4   (32.2-35.5)  g/dl


 


RDW Std Deviation  41.0   (36.4-46.3)  fL


 


Plt Count  223   (182-369)  K/mm3


 


MPV  10.9   (9.4-12.3)  fl


 


Neutrophils % (Manual)  34 L   (40-60)  %


 


Band Neutrophils %  0   (0-10)  %


 


Lymphocytes % (Manual)  23   (20-40)  %


 


Atypical Lymphs %  2   %


 


Monocytes % (Manual)  4   (2-10)  %


 


Eosinophils % (Manual)  36 H   (0.7-5.8)  %


 


Basophils % (Manual)  1   (0.1-1.2)  


 


Toxic Granulation  1+ slight   


 


Platelet Estimate  Adequate   


 


Plt Morphology Comment  Normal   


 


RBC Morph Comment  Normal   


 


Sodium   138  (136-145)  mEq/L


 


Potassium   3.0 L  (3.5-5.1)  mEq/L


 


Chloride   102  ()  mEq/L


 


Carbon Dioxide   22  (21-32)  mEq/L


 


Anion Gap   17.0 H  (5-15)  


 


BUN   10  (7-18)  mg/dL


 


Creatinine   1.0  (0.55-1.02)  mg/dL


 


Est Cr Clr Drug Dosing   68.67  mL/min


 


Estimated GFR (MDRD)   > 60  (>60)  mL/min


 


BUN/Creatinine Ratio   10.0 L  (14-18)  


 


Glucose   78  ()  mg/dL


 


Calcium   8.7  (8.5-10.1)  mg/dL


 


Total Bilirubin   0.5  (0.2-1.0)  mg/dL


 


AST   14 L  (15-37)  U/L


 


ALT   22  (14-59)  U/L


 


Alkaline Phosphatase   68  ()  U/L


 


C-Reactive Protein   < 0.2  (<1.0)  mg/dL


 


Total Protein   7.6  (6.4-8.2)  g/dl


 


Albumin   3.9  (3.4-5.0)  g/dl


 


Globulin   3.7  gm/dL


 


Albumin/Globulin Ratio   1.1  (1-2)  











Meds: 


Medications














Discontinued Medications














Generic Name Dose Route Start Last Admin





  Trade Name Daniel  PRN Reason Stop Dose Admin


 


Dicyclomine HCl  20 mg  18 23:44  18 00:01





  Bentyl  PO  18 23:45  20 mg





  ONETIME ONE   Administration





     





     





     





     


 


Diphenhydramine HCl  25 mg  18 21:25  18 22:17





  Benadryl  IVPUSH  18 21:26  25 mg





  ONETIME ONE   Administration





     





     





     





     


 


Diphenhydramine HCl  25 mg  18 23:44  18 00:01





  Benadryl  IVPUSH  18 23:45  25 mg





  ONETIME ONE   Administration





     





     





     





     


 


Hydromorphone HCl  0.5 mg  18 21:25  18 22:15





  Dilaudid  IVPUSH  18 21:26  0.5 mg





  ONETIME ONE   Administration





     





     





     





     


 


Hydromorphone HCl  0.5 mg  18 22:49  18 22:53





  Dilaudid  IVPUSH  18 22:50  0.5 mg





  ONETIME ONE   Administration





     





     





     





     


 


Hydromorphone HCl  1 mg  18 23:43  18 00:01





  Dilaudid  IVPUSH  18 23:44  1 mg





  ONETIME ONE   Administration





     





     





     





     


 


Dextrose/Sodium Chloride  1,000 mls @ 999 mls/hr  18 21:30  18 22:14





  Dextrose 5%-Normal Saline  IV   999 mls/hr





  ASDIRECTED BRIA   Administration





     





     





     





     


 


Ondansetron HCl  4 mg  18 21:25  18 22:14





  Zofran  IVPUSH  18 21:26  4 mg





  ONETIME ONE   Administration





     





     





     





     


 


Potassium Chloride  40 meq  18 23:44  18 00:01





  Klor-Con M20  PO  18 23:45  40 meq





  ONETIME ONE   Administration





     





     





     





     














- Re-Assessments/Exams


Free Text/Narrative Re-Assessment/Exam: 





18 22:15: KUB is within normal limits showing no signs of bowel 

obstruction. There is collections or pockets of gas throughout the large bowel. 

Labs are pending.





18 22:50 patient is requesting further analgesia. Will repeat Dilaudid 

0.5 mg IV. Labs are not yet completed.





18 23:34 Labs are partially back. Total white count is 12.07. 

Differential is pending. Hemoglobin is 14.4 with hematocrit of 41.8. Sodium is 

138 with a potassium low at 3.0. Chloride is 102 with a bicarbonate of 22. 

Anion gap is 17.0. BUN is 10. Creatinine is 1.0. GFR is greater than 60. 

Glucose is 78 calcium is 8.7 bilirubin is 0.5. AST is 14 with an ALT of 22. 

Alkaline phosphatase normal at 68. C-reactive protein is less than 0.2. 

Therefore the cause of her pain and diarrhea is inconclusive at this time. I'm 

going to place her on Bentyl 20 mg every 6 hours as needed for relief of 

abdominal cramping pain and hopefully control of bowel movements. Will give her 

10 Percocet tablets that she can take for pain relief one or 2 every 6 hours if 

needed just follow-up appointments arranged with Dr. Leonora Bunn   next week and her 

OB/GYN in [facility]. By history she requires a colonoscopy .





18 23:45 Still experiencing a good deal of crampy abdominal pain. Her 

labs reveal only hypokalemia from not being able to eat very well perhaps some 

through stool losses. Going to give her potassium chloride 40 mg K Dur I mouth 

now. Bentyl 20 mg by mouth now as well. Will repeat Dilaudid 1 mg IV and 

Benadryl 25 mg IV for acute pain relief. She will be discharged on Bentyl 20 mg 

every 6 hours for the next 3-5 days. 20 tablets  will be provided. We will also 

send her home with Percocet 5/3/25 milligrams tabs one or 2 every 4-6 hours 

needed for pain relief 16 tablets.                                            

                                                         18: Of note 

differential is now back on her lab. Neutrophil count was 34% which is low. 

There are 2 reactive lymphocytes identified. She is 36% eosinophils suggesting 

an allergic etiology to her symptoms. She needs to be investigated for 

eosinophillic  esophagitis and for possible parasitosis which may be returning 

to her diarrhea and abdominal pain.. Of note this differential came back after 

the patient was already discharged.

















Departure





- Departure


Time of Disposition: 00:30


Disposition: Home, Self-Care 01


Condition: Fair


Clinical Impression: 


 Marked eosinophilia





Abdominal pain


Qualifiers:


 Abdominal location: generalized Qualified Code(s): R10.84 - Generalized 

abdominal pain





Diarrhea


Qualifiers:


 Diarrhea type: presumed infectious Qualified Code(s): A09 - Infectious 

gastroenteritis and colitis, unspecified








- Discharge Information


Prescriptions: 


Dicyclomine [Bentyl] 20 mg PO Q6H PRN #20 tablet


 PRN Reason: Abdominal cramps/diarrhea


oxyCODONE HCl/Acetaminophen [Percocet 5-325 mg Tablet] 1 - 2 each PO Q4H PRN #

20 tablet


 PRN Reason: pain relief. 


Potassium Chloride 20 meq PO BID #10 tablet.er


Instructions:  Diarrhea, Adult, Abdominal Pain, Adult, Easy-to-Read


Referrals: 


Nolan Driscoll MD [Primary Care Provider] - 


Forms:  ED Department Discharge


Additional Instructions: 


Evaluation the emergency room tonight in regards to diffuse lower abdominal 

pain with loss of bladder control due to bowel urgency suggesting inflammation 

of the lower colon. X-ray of the abdomen did not show any signs of bowel 

obstruction or abnormalities. Lab tests also proved to be within normal limits. 

Other than a low serum potassium level from not eating as much and perhaps some 

loss through the stools. You're treated in the ED with IV fluids and pain 

medication. Received a total of 2 mg of Dilaudid IV and Benadryl 50 mg IV and 

Zofran 4 mg IV for nausea. At time of discharge also given Bentyl 20 mg by 

mouth to relieve abdominal cramping pain. Treatment at home is to continued 

soft diet and is mostly clear fluids. I would suggest use of Bentyl 20 mg every 

6 hours for the next 5 days to relieve abdominal cramping pain and slow down 

the bowel movements. May use Percocet tablets 5/3/25 milligrams strength one or 

2 every 4-6 hours as needed for pain relief over the weekend until he can get 

into see Dr. Carlos. Take 20 mg of potassium supplement twice daily for the 

next 5 days also to improve your serum potassium level.by history it is appears 

that you're going to need a colonoscopy and perhaps repeat laparoscopy as 

endometriosis recurrence is most likely the cause of current problems. 





- My Orders


Last 24 Hours: 


My Active Orders





18 21:26


Abdomen 1V Flat [CR] Stat 














- Assessment/Plan


Last 24 Hours: 


My Active Orders





18 21:26


Abdomen 1V Flat [CR] Stat

## 2018-06-22 NOTE — CR
Abdomen: Supine view of the abdomen was obtained.

 

Comparison: Prior renal stone protocol CT exam of 04/12/18.

 

No abnormal calcifications are seen.  Bowel gas pattern is normal.  

Bony structures are within normal limits.  Sclerotic area is seen 

within the right sacrum believed to represent bone islands.

 

Impression:

1.  Nothing acute is seen on supine abdominal x-ray.

 

Diagnostic code #2

## 2018-07-17 NOTE — EDM.PDOC
ED HPI GENERAL MEDICAL PROBLEM





- General


Chief Complaint: Gastrointestinal Problem


Stated Complaint: POST SURGICAL PAIN


Time Seen by Provider: 18 19:06


Source of Information: Reports: Patient


History Limitations: Reports: No Limitations





- History of Present Illness


INITIAL COMMENTS - FREE TEXT/NARRATIVE: 





Patient is a 28-year-old female presents emergency department complaining of 

abdominal pain. She has an extensive history of abdominal pain as well as 

multiple surgeries secondary to adhesions. Patient most recently had a surgery 

2018, during which time she had lysis of adhesions and removal of 2 

ovarian cysts on the right side per her history. Patient describes a complex 

history of intermittent abdominal pain mostly caused by adhesions endometriosis 

and interstitial cystitis. Patient takes multiple medications to deal with her 

pain including gabapentin Celexa Ativan Elmiron Neurontin Vesicare baclofen and 

buspirone. Patient does admit to smoking marijuana occasionally. She states her 

last smoke was approximately 2 weeks ago.


Onset: Gradual


Onset Date: 07/15/18


Duration: Day(s): (2-3 days), Getting Worse, Intermittent, Recurring


Location: Reports: Abdomen, Pelvis


Quality: Reports: Same as Previous Episode, Sharp, Throbbing


Severity: Moderate


Improves with: Reports: None


Worsens with: Reports: Movement


Context: Reports: Other (chronic hx of abdominal pain per hpi)


Treatments PTA: Reports: Other (see below) (baclofen, gabapentin)


  ** Abdominal


Pain Score (Numeric/FACES): 9





- Related Data


 Allergies











Allergy/AdvReac Type Severity Reaction Status Date / Time


 


codeine Allergy  Rash Verified 18 18:25


 


morphine Allergy  Hives Verified 18 18:25


 


ketorolac tromethamine AdvReac  Rash Verified 18 18:25





[From Toradol]     


 


metoclopramide HCl AdvReac  Irritabilit Verified 18 18:25





[From Reglan]   y  











Home Meds: 


 Home Meds





Baclofen 20 mg PO TID PRN 18 [History]


LORazepam [Ativan] 1 mg PO BID PRN 18 [History]


Pentosan Polysulfate Sodium [Elmiron] 100 mg PO DAILY 18 [History]


Solifenacin [Vesicare] 10 mg PO DAILY 18 [History]


busPIRone [Buspar] 15 mg PO TID 18 [History]


Citalopram Hydrobromide [Celexa] 20 mg PO DAILY 18 [History]


Gabapentin [Neurontin] 100 mg PO TID 18 [History]


Topiramate 25 mg PO BID 18 [History]


traMADol [Ultram ER] 100 mg PO BID 18 [History]


Dicyclomine [Bentyl] 20 mg PO Q6H PRN #20 tablet 18 [Rx]


Potassium Chloride 20 meq PO BID #10 tablet.er 18 [Rx]


oxyCODONE HCl/Acetaminophen [Percocet 5-325 mg Tablet] 1 - 2 each PO Q4H PRN #

20 tablet 18 [Rx]











Past Medical History





- Past Health History


Medical/Surgical History: Denies Medical/Surgical History


HEENT History: Reports: Impaired Vision


Other HEENT History: glasses and contacts


Gastrointestinal History: Reports: Hemorrhoids


Other Gastrointestinal History: Rectal fissures


Genitourinary History: Reports: Renal Calculus, Other (See Below)


Other Genitourinary History: endometreosis, kidney stones


OB/GYN History: Reports: Endometriosis, Pregnancy


Other OB/GYN History: left ovary removed and bowel surgery due to ovary embedded


Neurological History: Reports: Migraines


Psychiatric History: Reports: Anxiety, Depression


Hematologic History: Reports: Anemia


Oncologic (Cancer) History: Reports: Uterine


Dermatologic History: Reports: Eczema





- Past Surgical History


HEENT Surgical History: Reports: Oral Surgery


GI Surgical History: Reports: Lysis of Adhesions


Female  Surgical History: Reports:  Section, Hysterectomy, Kidney 

stone extraction, Oophorectomy, Ureteral Stent





Social & Family History





- Family History


Family Medical History: Noncontributory





- Tobacco Use


Smoking Status *Q: Current Every Day Smoker


Years of Tobacco use: 6


Packs/Tins Daily: 0.1





- Caffeine Use


Caffeine Use: Reports: None





- Recreational Drug Use


Recreational Drug Use: No





- Living Situation & Occupation


Living situation: Reports: , with Spouse, with Family (3 kids)


Occupation: Unemployed





ED ROS GENERAL





- Review of Systems


Review Of Systems: See Below


Constitutional: Reports: No Symptoms


HEENT: Reports: No Symptoms


Respiratory: Reports: No Symptoms


Cardiovascular: Reports: No Symptoms


Endocrine: Reports: No Symptoms


GI/Abdominal: Reports: Abdominal Pain, Nausea.  Denies: Black Stool, Bloody 

Stool, Constipation, Diarrhea, Melena, Vomiting


: Reports: Dysuria (chronic)


Musculoskeletal: Reports: No Symptoms


Skin: Reports: No Symptoms


Neurological: Reports: No Symptoms


Psychiatric: Reports: No Symptoms


Hematologic/Lymphatic: Reports: No Symptoms


Immunologic: Reports: No Symptoms





ED EXAM, GI/ABD





- Physical Exam


Exam: See Below


Exam Limited By: No Limitations


General Appearance: Alert, WD/WN, Anxious, Mild Distress


Eyes: Bilateral: Normal Appearance, EOMI


Ears: Normal External Exam, Normal Canal, Hearing Grossly Normal


Nose: Normal Inspection, Normal Mucosa, No Blood


Throat/Mouth: Normal Inspection, Normal Lips, Normal Teeth, Normal Gums, Normal 

Oropharynx, Normal Voice, No Airway Compromise


Head: Atraumatic, Normocephalic


Neck: Normal Inspection, Supple, Non-Tender, Full Range of Motion


Respiratory/Chest: No Respiratory Distress, Lungs Clear, Normal Breath Sounds, 

No Accessory Muscle Use, Chest Non-Tender


Cardiovascular: Normal Peripheral Pulses, Regular Rate, Rhythm, No Edema, No 

Gallop, No JVD, No Murmur, No Rub


GI/Abdominal Exam: Soft, No Organomegaly, No Distention, No Abnormal Bruit, No 

Mass, Pelvis Stable, Tender (worse in epgigastric region), Abnormal Bowel 

Sounds (decreased)


Back Exam: Full Range of Motion


Extremities: Normal Inspection, Normal Range of Motion, Non-Tender, Normal 

Capillary Refill, No Pedal Edema


Neurological: Alert, Oriented, CN II-XII Intact, Normal Cognition, Normal 

Reflexes, No Motor/Sensory Deficits


Psychiatric: Normal Affect, Tearful


Skin Exam: Warm, Dry, Intact, Normal Color, No Rash





Course





- Vital Signs


Last Recorded V/S: 


 Last Vital Signs











Temp  99.3 F   18 18:21


 


Pulse  100   18 18:21


 


Resp  17   18 18:21


 


BP  122/71   18 18:21


 


Pulse Ox  99   18 18:21














- Orders/Labs/Meds


Orders: 


 Active Orders 24 hr











 Category Date Time Status


 


 Abdomen Comp [US] Stat Exams  18 19:33 Taken


 


 UA W/MICROSCOPIC [URIN] Stat Lab  18 19:37 Ordered











Labs: 


 Laboratory Tests











  18 Range/Units





  19:37 20:30 20:30 


 


WBC   7.98   (3.98-10.04)  K/mm3


 


RBC   4.33   (3.98-5.22)  M/mm3


 


Hgb   13.4   (11.2-15.7)  gm/L


 


Hct   39.5   (34.1-44.9)  %


 


MCV   91.2   (79.4-94.8)  fl


 


MCH   30.9   (25.6-32.2)  pg


 


MCHC   33.9   (32.2-35.5)  g/dl


 


RDW Std Deviation   40.8   (36.4-46.3)  fL


 


Plt Count   286   (182-369)  K/mm3


 


MPV   10.2   (9.4-12.3)  fl


 


Neut % (Auto)   52.0   (34.0-71.1)  %


 


Lymph % (Auto)   28.8   (19.3-51.7)  %


 


Mono % (Auto)   5.5   (4.7-12.5)  %


 


Eos % (Auto)   13.2 H   (0.7-5.8)  


 


Baso % (Auto)   0.5   (0.1-1.2)  %


 


Neut # (Auto)   4.15   (1.56-6.13)  K/mm3


 


Lymph # (Auto)   2.30   (1.18-3.74)  K/mm3


 


Mono # (Auto)   0.44 H   (0.24-0.36)  K/mm3


 


Eos # (Auto)   1.05 H   (0.04-0.36)  K/mm3


 


Baso # (Auto)   0.04   (0.01-0.08)  K/mm3


 


Sodium    141  (136-145)  mEq/L


 


Potassium    3.2 L  (3.5-5.1)  mEq/L


 


Chloride    108 H  ()  mEq/L


 


Carbon Dioxide    25  (21-32)  mEq/L


 


Anion Gap    11.2  (5-15)  


 


BUN    7  (7-18)  mg/dL


 


Creatinine    0.9  (0.55-1.02)  mg/dL


 


Est Cr Clr Drug Dosing    75.61  mL/min


 


Estimated GFR (MDRD)    > 60  (>60)  mL/min


 


BUN/Creatinine Ratio    7.8 L  (14-18)  


 


Glucose    76  ()  mg/dL


 


Calcium    8.2 L  (8.5-10.1)  mg/dL


 


Total Bilirubin    0.3  (0.2-1.0)  mg/dL


 


AST    14 L  (15-37)  U/L


 


ALT    23  (14-59)  U/L


 


Alkaline Phosphatase    59  ()  U/L


 


Total Protein    7.1  (6.4-8.2)  g/dl


 


Albumin    3.7  (3.4-5.0)  g/dl


 


Globulin    3.4  gm/dL


 


Albumin/Globulin Ratio    1.1  (1-2)  


 


Urine Color  Yellow    (Yellow)  


 


Urine Appearance  Clear    (Clear)  


 


Urine pH  7.0    (5.0-8.0)  


 


Ur Specific Gravity  1.010    (1.005-1.030)  


 


Urine Protein  Negative    (Negative)  


 


Urine Glucose (UA)  Negative    (Negative)  


 


Urine Ketones  Negative    (Negative)  


 


Urine Occult Blood  Negative    (Negative)  


 


Urine Nitrite  Negative    (Negative)  


 


Urine Bilirubin  Negative    (Negative)  


 


Urine Urobilinogen  0.2    (0.2-1.0)  


 


Ur Leukocyte Esterase  Negative    (Negative)  


 


Urine RBC  0-5    (0-5)  /hpf


 


Urine WBC  0-5    (0-5)  /hpf


 


Ur Epithelial Cells  0-5    (0-5)  /hpf


 


Urine Bacteria  Few    (FEW)  /hpf


 


Urine Mucus  Not seen    (FEW)  /hpf











Meds: 


Medications














Discontinued Medications














Generic Name Dose Route Start Last Admin





  Trade Name Freq  PRN Reason Stop Dose Admin


 


Dicyclomine HCl  20 mg  18 19:34  18 20:21





  Bentyl  IM  18 19:35  20 mg





  ONETIME ONE   Administration





     





     





     





     


 


Hydromorphone HCl  0.25 mg  18 20:12  18 20:20





  Dilaudid  IM  18 20:13  0.25 mg





  ONETIME ONE   Administration





     





     





     





     


 


Potassium Chloride  20 meq  18 21:57  





  Potassium Chloride Solution  PO  18 21:58  





  ONETIME ONE   





     





     





     





     


 


Potassium Chloride  20 meq  18 22:13  18 22:20





  Klor-Con M20  PO  18 22:14  Not Given





  ONETIME ONE   





     





     





     





     


 


Prochlorperazine Maleate  5 mg  18 19:38  18 20:21





  Compazine  PO  18 19:39  5 mg





  ONETIME ONE   Administration





     





     





     





     














- Radiology Interpretation


Free Text/Narrative:: 





Ultrasound of the abdomen was read by Natalie. Clinical impression persistent 

mild right hydronephrosis, hepatic dome echogenic focus, most likely 

hemangioma. Gallbladder was unremarkable no gallstones, bile ducts unremarkable 

as visualize no stones noted on dictation and pancreas was normal.





- Re-Assessments/Exams


Free Text/Narrative Re-Assessment/Exam: 





18 00:32


Head in-depth discussion with the patient regarding her laboratory studies and 

her ultrasound findings. Patient had been given 0.25 of Dilaudid as well as 20 

mg of Bentyl IM. Patient requests more Dilaudid. I had an in-depth discussion 

with the patient regarding opioid addiction while treating pain syndromes. I 

informed her that she does not have any life-threatening condition happening 

with her today and that I could not justify continuing to give her doses of 

Dilaudid for an undiagnosed medical condition. I offered to call her surgeon 

while she was in this department to see if he had any further recommendations. 

The patient states that she can call her surgeon at home tomorrow and that is 

not necessary for me to call him. I inquired as to whether the patient would be 

able to get an appointment quickly and she stated yes. I offered to call and 

make an appointment for her on tomorrow however the patient stated she could 

not go tomorrow and perhaps Thursday would be the earliest she could go. I 

offered to call and make that appointment however the patient stated she would 

do that on her own. The patient is requesting more Dilaudid prior to her 

discharge. I did decline that request.  Patient asked me if I pre-judged her. I 

assured the patient that not having ever seen her or known her I did her workup 

I read her chart and made my own determination based on my physical exam 

findings and her workup findings. Patient was not satisfied with that 

explanation however it wasn't amicable parting. Patient did not stay to receive 

her potassium but left prior to receiving discharge papers.





Departure





- Departure


Time of Disposition: 22:00


Disposition: Home, Self-Care 01


Condition: Good


Clinical Impression: 


Abdominal pain


Qualifiers:


 Abdominal location: generalized Qualified Code(s): R10.84 - Generalized 

abdominal pain








- Discharge Information


*PRESCRIPTION DRUG MONITORING PROGRAM REVIEWED*: No


*COPY OF PRESCRIPTION DRUG MONITORING REPORT IN PATIENT [facility]: No


Instructions:  Abdominal Pain, Adult


Referrals: 


Nolan Driscoll MD [Primary Care Provider] - 


Forms:  ED Department Discharge


Additional Instructions: 


#1. Patient was instructed to call her surgeon's office on tomorrow to make an 

appointment to be seen on tomorrow or at her earliest convenience. 











- My Orders


Last 24 Hours: 


My Active Orders





18 19:33


Abdomen Comp [US] Stat 





18 19:37


UA W/MICROSCOPIC [URIN] Stat 














- Assessment/Plan


Last 24 Hours: 


My Active Orders





18 19:33


Abdomen Comp [US] Stat 





18 19:37


UA W/MICROSCOPIC [URIN] Stat

## 2018-07-18 NOTE — US
Abdominal ultrasound: Multiple real-time images were obtained.

 

Comparison: Previous CT abdomen and pelvis exam as well as renal 

ultrasound exam both performed on 04/12/18.

 

Findings: Hyperechoic nodule is identified within the right lobe of 

the liver measuring 1.8 cm most likely representing a small 

hemangioma.  No additional abnormality is appreciated within the 

liver.  Gallbladder shows no gallstones.  No gallbladder wall 

thickening or biliary duct dilatation is seen.  Slight hydronephrosis 

is noted of the right kidney.  This appears similar to prior 

ultrasound exam.  Left kidney shows no hydronephrosis.  Right kidney 

length is 10.6 cm, left kidney length is 10.0 cm.  Spleen size is 

normal.  Aorta shows no aneurysmal dilatation.  Pancreas shows no 

discrete abnormality.  Inferior vena cava is patent.  Portal vein 

shows normal hepatopedal flow.

 

Impression:

1.  Mild right kidney hydronephrosis which is stable from prior renal 

ultrasound.

2.  Small hyperechoic finding within the right lobe of the liver most 

likely representing incidental hemangioma.

3.  No additional abnormality is appreciated on abdominal ultrasound 

exam.

 

Diagnostic code #3

 

I agree with preliminary report issued by [facility] ([facility] report finalized 

on 07/17/18, 10:23 PM Central Time)

## 2019-02-18 NOTE — EDM.PDOC
ED HPI GENERAL MEDICAL PROBLEM





- General


Chief Complaint: Syncope


Stated Complaint: [facility]


Time Seen by Provider: 19 00:56


Source of Information: Reports: Patient, EMS Notes Reviewed


History Limitations: Reports: No Limitations





- History of Present Illness


INITIAL COMMENTS - FREE TEXT/NARRATIVE: 


28-year-old female presents to the ED per ambulance after syncope episode at 

home this morning. Her  heard her fall in the kitchen. He attended her 

and try to set her up she passed out twice more. He therefore called 911. 

Patient is prone to fainting. Her  has cauterized several times in the 

last several months and protect her from falling to the floor. He is quite 

petite. She reports her blood pressures usually on the low side. She relates 

that she's been diagnosed with interstitial cystitis of the urinary bladder and 

has been having a flareup with chronic pain in her right lower quadrant 

particularly. She also has a history of irritable bowel syndrome with 

intermittent diffuse lower bowel cramps. Pain has been worse as of late and was 

quite bad tonight. Possibility of vasovagal event is evident. She reports that 

she had 3 good meals yesterday she had 3 piece of cheesecake last evening and 

is unlikely to have hypoglycemia. She doesn't think she is anemic. She reports 

she was in the clinic last week to have a urinalysis done and was normal. She 

denies possibility of pregnancy as she has had a hysterectomy. Blood pressure 

lying is 109/78. She reports she did not hit her head when she passed out this 

morning. Does have some pain in her buttock area.





Onset: Today


Onset Date: 19


Onset Time: 00:30


Duration: Minutes:


Location: Reports: Generalized (Syncopal event apparently while walking in the 

kitchen this morning. Fell to the floor when her  attended her and try 

to sit up she passed out twice more.)


Quality: Reports: Other


Severity: Moderate (Syncopal event)


Improves with: Reports: None


Worsens with: Reports: None


Context: Reports: Other.  Denies: Activity, Exercise, Lifting, Sick Contact, 

Trauma


Associated Symptoms: Reports: Nausea/Vomiting, Syncope, Other (Diffuse lower 

abdominal pain right lower quadrant versus midline).  Denies: Confusion (

Syncope at home), Chest Pain, Cough, Diaphoresis, Fever/Chills, Headaches, Loss 

of Appetite, Malaise, Rash, Seizure (Mild nausea), Shortness of Breath


Treatments PTA: Reports: Other (see below) (None.)


  ** Lower Perineal Area


Pain Score (Numeric/FACES): 10





- Related Data


 Allergies











Allergy/AdvReac Type Severity Reaction Status Date / Time


 


codeine Allergy  Rash Verified 19 00:57


 


morphine Allergy  Hives Verified 19 00:57


 


ketorolac tromethamine AdvReac  Rash Verified 19 00:57





[From Toradol]     


 


metoclopramide HCl AdvReac  Irritabilit Verified 19 00:57





[From Reglan]   y  











Home Meds: 


 Home Meds





Baclofen 10 mg PO TID PRN 18 [History]


LORazepam [Ativan] 1 mg PO BID PRN 18 [History]


Pentosan Polysulfate Sodium [Elmiron] 100 mg PO TID 18 [History]


Solifenacin [Vesicare] 10 mg PO DAILY 18 [History]


busPIRone [Buspar] 15 mg PO DAILY 18 [History]


Citalopram Hydrobromide [Celexa] 20 mg PO DAILY 18 [History]


Gabapentin [Neurontin] 100 mg PO TID 18 [History]


Topiramate 100 mg PO BID 18 [History]


traMADol [Ultram ER] 100 mg PO BID PRN 18 [History]


Dicyclomine [Bentyl] 20 mg PO Q6H PRN #20 tablet 18 [Rx]


Potassium Chloride 20 meq PO BID #10 tablet.er 18 [Rx]


Phenazopyridine [Pyridium] 100 mg PO DAILY 19 [History]











Past Medical History





- Past Health History


Medical/Surgical History: Denies Medical/Surgical History


HEENT History: Reports: Impaired Vision


Other HEENT History: glasses and contacts


Cardiovascular History: Reports: Other (See Below)


Other Cardiovascular History: palpitations, irregular heartbeat due to stress.


Gastrointestinal History: Reports: Hemorrhoids


Other Gastrointestinal History: Rectal fissures. Diagnosis of irritable bowel 

syndrome intermittent constipation and diarrhea


Genitourinary History: Reports: Renal Calculus, Other (See Below)


Other Genitourinary History: endometreosis, kidney stones, reflux of urine into 

R) kidney. Recently diagnosed with interstitial cystitis of the urinary bladder.


OB/GYN History: Reports: Endometriosis, Pregnancy


Other OB/GYN History: left ovary removed and bowel surgery due to ovary embedded


Neurological History: Reports: Migraines


Psychiatric History: Reports: Anxiety, Depression


Hematologic History: Reports: Anemia


Oncologic (Cancer) History: Reports: Uterine


Dermatologic History: Reports: Eczema





- Past Surgical History


HEENT Surgical History: Reports: Oral Surgery


Other HEENT Surgeries/Procedures: wisdom teeth removed.


GI Surgical History: Reports: Lysis of Adhesions


Female  Surgical History: Reports:  Section, Hysterectomy, Kidney 

stone extraction, Oophorectomy, Ureteral Stent





Social & Family History





- Family History


Family Medical History: Noncontributory





- Caffeine Use


Caffeine Use: Reports: Tea





- Living Situation & Occupation


Living situation: Reports: , with Spouse, with Family (3 kids)


Occupation: Unemployed





ED [facility] GENERAL





- Review of Systems


Review Of Systems: See Below


Constitutional: Reports: Weakness, Fatigue.  Denies: Fever, Chills, Malaise, 

Decreased Appetite, Weight Loss


HEENT: Reports: Glasses


Respiratory: Reports: No Symptoms


Cardiovascular: Reports: Lightheadedness, Syncope (Syncopal event tonight while 

walking into the kitchen.)


Endocrine: Reports: Fatigue


GI/Abdominal: Reports: Abdominal Pain (Alden pain much worse as of late felt 

to be due to flareup of interstitial cystitis. She also has a history of 

endometriosis and irritable bowel syndrome.)


: Reports: Frequency


Musculoskeletal: Reports: Other (Pain but talks and lower back from syncopal 

episode tonight)


Skin: Reports: No Symptoms


Neurological: Reports: Syncope, Weakness.  Denies: Confusion, Dizziness, 

Headache, Numbness, Paresthesia, Pre-Existing Deficit, Seizure, Tingling, 

Tremors, Trouble Speaking, Difficulty Walking


Psychiatric: Reports: No Symptoms


Hematologic/Lymphatic: Reports: No Symptoms


Immunologic: Reports: No Symptoms





- Physical Exam


Exam: See Below


Exam Limited By: No Limitations


General Appearance: Alert, WD/WN, No Apparent Distress


Eye Exam: Bilateral Eye: Normal Inspection, PERRL


Throat/Mouth: Normal Inspection, Normal Lips, Normal Teeth, Normal Oropharynx, 

Other


Head Exam: Atraumatic (No injury to her tongue or oropharynx from the fall), 

Normocephalic


Neck: Normal Inspection, Supple, Non-Tender, Full Range of Motion.  No: Carotid 

Bruit, Lymphadenopathy (L), Lymphadenopathy (R)


Respiratory/Chest: No Respiratory Distress, Lungs Clear, Normal Breath Sounds, 

No Accessory Muscle Use, Other


Cardiovascular: Normal Peripheral Pulses, Regular Rate, Rhythm, No Edema, No 

Gallop, No Murmur (3 pain on compression of her rib cage or sternum), No Rub, 

Other (Heart rate 88 in sinus on the monitor)


GI/Abdominal: Normal Bowel Sounds, Non-Tender, No Organomegaly, No Abnormal 

Bruit, Guarding, Other.  No: Rigid (The abdomen is firm to palpation 

particularly right lower quadrant perhaps with mild guarding), Rebound, Tender


Neuro Exam (Abbreviated): Alert, Oriented, CN II-XII Intact, Normal Cognition, 

No Motor/Sensory Deficits


Back Exam: Normal Inspection, Full Range of Motion.  No: CVA Tenderness (L), 

CVA Tenderness (R)


Extremities: Normal Inspection, Normal Range of Motion, Non-Tender, No Pedal 

Edema


Psychiatric: Normal Affect, Normal Mood


Skin Exam: Warm, Dry, Intact, Normal Color, No Rash





EKG INTERPRETATION


EKG Date: 19


Time: 01:07


Rhythm: NSR


Rate (Beats/Min): 90


Axis: Normal


P-Wave: Present


QRS: Normal


ST-T: Normal


QT: Prolonged





Course





- Vital Signs


Last Recorded V/S: 


 Last Vital Signs











Temp  37.4 C   19 00:52


 


Pulse  88   19 00:52


 


Resp  16   19 00:52


 


BP  109/73   19 00:52


 


Pulse Ox  98   19 00:52








 





Orthostatic Blood Pressure [     108/77


Standing]                        


Orthostatic Blood Pressure [     112/84


Sitting]                         


Orthostatic Blood Pressure [     107/68


Supine]                          











- Orders/Labs/Meds


Orders: 


 Active Orders 24 hr











 Category Date Time Status


 


 EKG Documentation Completion [RC] STAT Care  19 00:57 Active


 


 Orthostatic Vital Signs [RC] ASDIRECTED Care  19 00:59 Active


 


 Abdomen 1V Flat [CR] Stat Exams  19 01:04 Taken











Labs: 


 Laboratory Tests











  19 Range/Units





  01:10 01:10 01:10 


 


WBC  4.86    (3.98-10.04)  K/mm3


 


RBC  4.39    (3.98-5.22)  M/mm3


 


Hgb  13.2    (11.2-15.7)  gm/L


 


Hct  40.6    (34.1-44.9)  %


 


MCV  92.5    (79.4-94.8)  fl


 


MCH  30.1    (25.6-32.2)  pg


 


MCHC  32.5    (32.2-35.5)  g/dl


 


RDW Std Deviation  40.9    (36.4-46.3)  fL


 


Plt Count  217    (182-369)  K/mm3


 


MPV  10.9    (9.4-12.3)  fl


 


Neutrophils % (Manual)  31 L    (40-60)  %


 


Band Neutrophils %  0    (0-10)  %


 


Lymphocytes % (Manual)  56 H    (20-40)  %


 


Atypical Lymphs %  0    %


 


Monocytes % (Manual)  10    (2-10)  %


 


Eosinophils % (Manual)  3    (0.7-5.8)  %


 


Basophils % (Manual)  0 L    (0.1-1.2)  


 


Platelet Estimate  Adequate    


 


RBC Morph Comment  Normal    


 


ESR    6  (0-20)  mm/hr


 


Sodium   139   (136-145)  mEq/L


 


Potassium   3.7   (3.5-5.1)  mEq/L


 


Chloride   105   ()  mEq/L


 


Carbon Dioxide   26   (21-32)  mEq/L


 


Anion Gap   11.7   (5-15)  


 


BUN   13   (7-18)  mg/dL


 


Creatinine   1.0   (0.55-1.02)  mg/dL


 


Est Cr Clr Drug Dosing   68.05   mL/min


 


Estimated GFR (MDRD)   > 60   (>60)  mL/min


 


BUN/Creatinine Ratio   13.0 L   (14-18)  


 


Glucose   95   ()  mg/dL


 


Calcium   8.7   (8.5-10.1)  mg/dL


 


Magnesium   2.0   (1.8-2.4)  mg/dl


 


Total Bilirubin   0.2   (0.2-1.0)  mg/dL


 


AST   14 L   (15-37)  U/L


 


ALT   20   (14-59)  U/L


 


Alkaline Phosphatase   52   ()  U/L


 


C-Reactive Protein   < 0.2   (<1.0)  mg/dL


 


Total Protein   7.2   (6.4-8.2)  g/dl


 


Albumin   3.8   (3.4-5.0)  g/dl


 


Globulin   3.4   gm/dL


 


Albumin/Globulin Ratio   1.1   (1-2)  











Meds: 


Medications














Discontinued Medications














Generic Name Dose Route Start Last Admin





  Trade Name Dainel  PRN Reason Stop Dose Admin


 


Diphenhydramine HCl  25 mg  19 01:48  19 02:01





  Benadryl  IVPUSH  19 01:49  25 mg





  ONETIME ONE   Administration





     





     





     





     


 


Hydromorphone HCl  0.5 mg  19 01:13  19 01:22





  Dilaudid  IVPUSH  19 01:14  0.5 mg





  ONETIME ONE   Administration





     





     





     





     


 


Hydromorphone HCl  0.5 mg  19 01:49  19 01:59





  Dilaudid  IVPUSH  19 01:50  0.5 mg





  ONETIME ONE   Administration





     





     





     





     


 


Hydromorphone HCl  0.5 mg  19 02:35  19 02:46





  Dilaudid  IVPUSH  19 02:36  0.5 mg





  ONETIME ONE   Administration





     





     





     





     


 


Dextrose/Sodium Chloride  1,000 mls @ 500 mls/hr  19 01:00  19 01:25





  Dextrose 5%-Normal Saline  IV   500 mls/hr





  ASDIRECTED BRIA   Administration





     





     





     





     


 


Lorazepam  1 mg  19 02:35  19 02:48





  Ativan  IVPUSH  19 02:36  1 mg





  ONETIME ONE   Administration





     





     





     





     


 


Magnesium Citrate  210 ml  19 02:36  19 02:48





  Citrate Of Magnesia  PO  19 02:37  210 ml





  ONETIME ONE   Administration





     





     





     





     


 


Ondansetron HCl  4 mg  19 01:14  19 01:20





  Zofran  IVPUSH  19 01:15  4 mg





  ONETIME ONE   Administration





     





     





     





     














- Radiology Interpretation


Free Text/Narrative:: 


28-year-old female presents to the ED after a syncopal episode occurred at home 

while she was walking into the kitchen this this morning. Her  was in 

bed but heard her hit the floor. When he attended her she was quite lightheaded 

dizzy and when he sat her up she passed out twice more. He was therefore 

concerned that she may have hit her head harder than he thought. She reports 

that she did not hit her head. She reports that she's been having increased 

lower abdominal pain which is being blamed on therapy of interstitial cystitis. 

She reports she was in the clinic last week and had a normal urinalysis. She 

denies possibility of pregnancy. She also carries  a diagnosis of endometriosis 

and irritable bowel syndrome. She reports that the abdominal pain has been 

quite a bit worse as of late. It was quite bad tonight. This suggests that the 

pain may have caused a vasovagal attack and caused her syncopal event. The 

pressure currently is 108/77 with a heart rate of 87 in sinus. She is alert 

oriented I could find no evidence of significant injury from syncopal event. An 

ECG. Routine labs. IV will be D5 normal saline at 500 mils an hour. She reports 

she had 3 good meals yesterday has be drinking normally.








- Re-Assessments/Exams


Free Text/Narrative Re-Assessment/Exam: 





19 01:09 orthostatic BPs show /68 with a pulse of 80 supine. 

Sitting /84 with a pulse of 90. Standing BP is 108/77 with a pulse 101. 

Her pressure drops with standing but minimal change.





19 01:15 she is not significantly orthostatic. She reports that her pain 

is currently 10 out of 10. She appears sleepy and somewhat groggy. She is on a 

host of medications for muscle spasm and depression etc. We'll give her 

Dilaudid 0.5 mg IV with Zofran 4 mg IV for pain.





19 01:49  is stating that she has received very little to no pain relief 

from the Dilaudid 0.5 mg IV. Plan repeat Dilaudid 0.5 mg IV with Benadryl 25 mg 

IV. X-ray of the abdomen does confirm constipation with the right hemicolon 

being pretty full of stool. There is a mild amount of stool in the descending 

colon as well. No signs of bowel obstruction





19 02:12 Labs reveal a normal white count at 4.86. Differential is 31% 

neutrophils and 56% lymphocytes I right shift suggesting viral infection. 

Hemoglobin is 13.2 with hematocrit of 40.6. Blood count 217,000. Sodium 139 

with potassium of 3.7. Cord 105 with a bicarbonate 26. And a gap is 11.7. BUN 

is 13 with crowding of 1.0. GFR is greater than 60. Glucose is 95. Calcium is 

8.7. Magnesium is 2.0. Liver function is normal. C-reactive protein less than 

0.2. Total protein 7.2 with an albumin fraction 3.8. Urinalysis has not yet 

been collected.





19 02:36 patient reviewed and still having significant lower abdominal 

pain. She has a high tolerance to pain medications. Counseled at length about 

the obvious problem with constipation involving the right hemicolon parts of 

the descending colon. I believe her IBS may be secondary to constipation with 

overflow intermittently causing diarrhea. Treatment will be magnesium citrate 

Citroma 7 ounces mixed with 6 ounces of juice by mouth once later this morning 

to provide bowel cleanse. Then instructed to start MiraLAX powder 17 g daily 

for the next month and write down on the counter for her bowel function is on a 

daily basis. I will provide her with Dilaudid 0.5 mg IV for further pain relief 

and Ativan 1 mg IV for pain relief at this time. When she is more controlled 

her  will come and pick her up. He'll follow-up with Dr. Carlos as 

planned. Plan is to establish a firm diagnosis of interstitial cystitis of the 

urinary bladder. It's unclear whether she has tried a significant trial of 

intravesical BCG the diagnosis has been firmly established.








Departure





- Departure


Time of Disposition: 03:00


Disposition: Home, Self-Care 01


Condition: Fair


Clinical Impression: 


 Constipation by delayed colonic transit, Vasovagal syncope, Chronic 

interstitial cystitis





Abdominal pain


Qualifiers:


 Abdominal location: generalized Qualified Code(s): R10.84 - Generalized 

abdominal pain








- Discharge Information


*PRESCRIPTION DRUG MONITORING PROGRAM REVIEWED*: Not Applicable


*COPY OF PRESCRIPTION DRUG MONITORING REPORT IN PATIENT CARLOS: Not Applicable


Instructions:  Abdominal Pain, Adult, Easy-to-Read, Vasovagal Syncope, Adult


Referrals: 


PCP,Unknown [Primary Care Provider] - 


Forms:  ED Department Discharge


Additional Instructions: 


Evaluation in the emergency room this morning in regards to passing out at home 

or fainting. I believe this is a reaction to severe pain in the lower abdomen. 

Pain response is called vasovagal syndrome. This lowers her heart rate when we 

are back pain and then drops her blood pressure which causes us to pass out if 

we are standing. All the other investigations done in the ED including a heart 

tracing were normal. X-ray of the head and does show palms with constipation is 

the whole entire right hemicolon is full of stool down into the pelvis in parts 

of the descending colon on the left side of the abdomen are also stool-filled. 

With a history of IBS it is possible you are suffering from chronic 

constipation as multiple medications you're currently taking would slow the 

bowel down. Sometimes you can have diarrhea with fluid being pushed around a 

constipation stool bolus. I would therefore advise magnesium citrate 7 ounces 

taken later this morning with 6 ounces of juice of choice or Gatorade Powerade 

area and this takes 1-2 hours to work and will make her bowels work 3 or 4 

times often ending in some diarrhea. Then I would suggest starting MiraLAX 

powder 17 g or 1 scoop daily for the next month and and then evaluate bowel 

function to see if you're much improved. I would suggest rating down daily 

bowel function on the calendar so you can keep track. Medication is safe to be 

taken long-term in the bowel does not become dependent on it. It has no tasting 

to be taken with any form of fluid or juice.





- My Orders


Last 24 Hours: 


My Active Orders





19 00:57


EKG Documentation Completion [RC] STAT 





19 00:59


Orthostatic Vital Signs [RC] ASDIRECTED 





19 01:04


Abdomen 1V Flat [CR] Stat 














- Assessment/Plan


Last 24 Hours: 


My Active Orders





19 00:57


EKG Documentation Completion [RC] STAT 





19 00:59


Orthostatic Vital Signs [RC] ASDIRECTED 





19 01:04


Abdomen 1V Flat [CR] Stat

## 2019-02-18 NOTE — CR
Abdomen: Supine view of the abdomen was obtained.

 

Comparison: Previous abdominal x-ray of 06/21/18.

 

Mild increased stool within the colon is seen.  Bowel gas pattern is 

otherwise unremarkable.  No abnormal calcifications or soft tissue 

abnormality is seen.  Bony structures are unremarkable.

 

Impression:

1.  Mild increased stool within the colon.

2.  Supine abdominal x-ray is otherwise unremarkable.

 

Diagnostic code #2

## 2019-02-25 NOTE — EDM.PDOC
ED HPI GENERAL MEDICAL PROBLEM





- General


Chief Complaint: Syncope


Stated Complaint: PASSES OUT


Time Seen by Provider: 19 17:34


Source of Information: Reports: Patient, RN Notes Reviewed





- History of Present Illness


INITIAL COMMENTS - FREE TEXT/NARRATIVE: 





28-year-old female comes in with complaint of syncopal event at home a short 

time ago.  She states she "passed out 58 days ago, was evaluated here in the 

ED. Lab work all came back relatively normal, see record for details. She 

states she then had "2 more syncopal events 5 days ago and then this late 

afternoon passed out while standing without warning. No chest pain or 

difficulty breathing. There's been no nausea vomiting or diarrhea. No cough 

fever or chills. On multiple meds for multiple problems.  She is complaining of 

a lot of bladder and low back discomfort which she has had in the past. She 

states she has "interstitial cystitis".  She has had some voiding pain and 

frequency.


  ** Bladder


Pain Score (Numeric/FACES): 8





- Related Data


 Allergies











Allergy/AdvReac Type Severity Reaction Status Date / Time


 


codeine Allergy  Rash Verified 19 00:57


 


morphine Allergy  Hives Verified 19 00:57


 


ketorolac tromethamine AdvReac  Rash Verified 19 00:57





[From Toradol]     


 


metoclopramide HCl AdvReac  Irritabilit Verified 19 00:57





[From Reglan]   y  











Home Meds: 


 Home Meds





Baclofen 10 mg PO TID PRN 18 [History]


LORazepam [Ativan] 1 mg PO BID PRN 18 [History]


Pentosan Polysulfate Sodium [Elmiron] 100 mg PO TID 18 [History]


Solifenacin [Vesicare] 10 mg PO DAILY 18 [History]


busPIRone [Buspar] 15 mg PO DAILY 18 [History]


Citalopram Hydrobromide [Celexa] 20 mg PO DAILY 18 [History]


Gabapentin [Neurontin] 100 mg PO BEDTIME 18 [History]


Topiramate 100 mg PO BID 18 [History]


traMADol [Ultram ER] 100 mg PO BID PRN 18 [History]


Dicyclomine [Bentyl] 20 mg PO Q6H PRN #20 tablet 18 [Rx]


Phenazopyridine [Pyridium] 100 mg PO DAILY PRN 19 [History]











Past Medical History





- Past Health History


Medical/Surgical History: Denies Medical/Surgical History


HEENT History: Reports: Impaired Vision


Other HEENT History: glasses and contacts


Cardiovascular History: Reports: Other (See Below)


Other Cardiovascular History: palpitations, irregular heartbeat due to stress.


Gastrointestinal History: Reports: Hemorrhoids


Other Gastrointestinal History: Rectal fissures. Diagnosis of irritable bowel 

syndrome intermittent constipation and diarrhea


Genitourinary History: Reports: Renal Calculus, Other (See Below)


Other Genitourinary History: endometreosis, kidney stones, reflux of urine into 

R) kidney. Recently diagnosed with interstitial cystitis of the urinary bladder.


OB/GYN History: Reports: Endometriosis, Pregnancy


Other OB/GYN History: left ovary removed and bowel surgery due to ovary embedded


Neurological History: Reports: Migraines, Other (See Below)


Other Neuro History: syncope


Psychiatric History: Reports: Anxiety, Depression


Hematologic History: Reports: Anemia


Oncologic (Cancer) History: Reports: Uterine


Dermatologic History: Reports: Eczema





- Past Surgical History


HEENT Surgical History: Reports: Oral Surgery


Other HEENT Surgeries/Procedures: wisdom teeth removed.


GI Surgical History: Reports: Lysis of Adhesions


Female  Surgical History: Reports:  Section, Hysterectomy, Kidney 

stone extraction, Oophorectomy, Ureteral Stent


Musculoskeletal Surgical History: Reports: Other (See Below)


Other Musculoskeletal Surgeries/Procedures:: dislocated lumbar disc





Social & Family History





- Family History


Family Medical History: Noncontributory





- Tobacco Use


Smoking Status *Q: Never Smoker





- Caffeine Use


Caffeine Use: Reports: Coffee, Tea





- Recreational Drug Use


Recreational Drug Use: No





- Living Situation & Occupation


Living situation: Reports: , with Spouse, with Family (3 kids)


Occupation: Unemployed





ED ROS GENERAL





- Review of Systems


Review Of Systems: See Below


Constitutional: Denies: Fever, Chills


HEENT: Denies: Sinus Problem, Throat Pain, Other


Respiratory: Denies: Pleuritic Chest Pain


Cardiovascular: Denies: Chest Pain


GI/Abdominal: Denies: Abdominal Pain, Nausea, Vomiting


: Reports: Dysuria, Pain (Lower abdomen and pelvis anteriorly with radiation 

to her back)


Musculoskeletal: Reports: Back Pain


Neurological: Denies: Numbness, Tingling





- Physical Exam


Exam: See Below


General Appearance: Alert, No Apparent Distress


Eye Exam: Bilateral Eye: PERRL


Throat/Mouth: Normal Inspection, Normal Oropharynx.  No: Evidence of Tongue 

Biting


Head Exam: Atraumatic.  No: Facial Swelling


Neck: Supple, Full Range of Motion


Respiratory/Chest: No Respiratory Distress, Lungs Clear, Normal Breath Sounds


Cardiovascular: Regular Rate, Rhythm


GI/Abdominal: Non-Tender, Tender (Mild tenderness over the lower mid abdomen).  

No: Guarding, Rebound


Neuro Exam (Abbreviated): Alert, Oriented, No Motor/Sensory Deficits


Back Exam: Paraspinal Tenderness (Right low back), Other (No visible bruising 

or swelling over the lower back).  No: CVA Tenderness (L), CVA Tenderness (R), 

Vertebral Tenderness


Skin Exam: Warm, Dry, Normal Color





Course





- Vital Signs


Last Recorded V/S: 


 Last Vital Signs











Temp  99.1 F   19 17:00


 


Pulse  90   19 17:00


 


Resp  20   19 17:00


 


BP  111/90   19 17:00


 


Pulse Ox  100   19 17:00








 





Orthostatic Blood Pressure [     102/63


Standing]                        


Orthostatic Blood Pressure [     109/70


Supine]                          











- Orders/Labs/Meds


Orders: 


 Active Orders 24 hr











 Category Date Time Status


 


 Holter Monitor 48 Hours [RC] .PRN Care  19 18:03 Active











Labs: 


 Laboratory Tests











  19 Range/Units





  18:15 18:35 


 


WBC   6.67  (3.98-10.04)  K/mm3


 


RBC   4.32  (3.98-5.22)  M/mm3


 


Hgb   13.0  (11.2-15.7)  gm/L


 


Hct   39.0  (34.1-44.9)  %


 


MCV   90.3  (79.4-94.8)  fl


 


MCH   30.1  (25.6-32.2)  pg


 


MCHC   33.3  (32.2-35.5)  g/dl


 


RDW Std Deviation   40.1  (36.4-46.3)  fL


 


Plt Count   237  (182-369)  K/mm3


 


MPV   10.4  (9.4-12.3)  fl


 


Neut % (Auto)   58.3  (34.0-71.1)  %


 


Lymph % (Auto)   31.6  (19.3-51.7)  %


 


Mono % (Auto)   7.2  (4.7-12.5)  %


 


Eos % (Auto)   2.5  (0.7-5.8)  


 


Baso % (Auto)   0.3  (0.1-1.2)  %


 


Neut # (Auto)   3.88  (1.56-6.13)  K/mm3


 


Lymph # (Auto)   2.11  (1.18-3.74)  K/mm3


 


Mono # (Auto)   0.48 H  (0.24-0.36)  K/mm3


 


Eos # (Auto)   0.17  (0.04-0.36)  K/mm3


 


Baso # (Auto)   0.02  (0.01-0.08)  K/mm3


 


Urine Color  Light yellow   (Yellow)  


 


Urine Appearance  Clear   (Clear)  


 


Urine pH  7.5   (5.0-8.0)  


 


Ur Specific Gravity  1.015   (1.005-1.030)  


 


Urine Protein  Negative   (Negative)  


 


Urine Glucose (UA)  Negative   (Negative)  


 


Urine Ketones  Negative   (Negative)  


 


Urine Occult Blood  Negative   (Negative)  


 


Urine Nitrite  Negative   (Negative)  


 


Urine Bilirubin  Negative   (Negative)  


 


Urine Urobilinogen  0.2   (0.2-1.0)  


 


Ur Leukocyte Esterase  Negative   (Negative)  


 


Urine RBC  Not seen   (0-5)  /hpf


 


Urine WBC  0-5   (0-5)  /hpf


 


Ur Epithelial Cells  0-5   (0-5)  /hpf


 


Amorphous Sediment  Moderate H   (NOT SEEN)  /hpf


 


Urine Bacteria  Rare   (FEW)  /hpf


 


Urine Mucus  Not seen   (FEW)  /hpf











Meds: 


Medications














Discontinued Medications














Generic Name Dose Route Start Last Admin





  Trade Name Freq  PRN Reason Stop Dose Admin


 


Hydromorphone HCl  1 mg  19 18:03  





  Dilaudid  IM  19 18:04  





  ONETIME ONE   





     





     





     





     


 


Hydromorphone HCl  1 mg  19 19:00  





  Dilaudid  IM  19 19:01  





  ONETIME ONE   





     





     





     





     














- Re-Assessments/Exams


Free Text/Narrative Re-Assessment/Exam: 





19 19:12


UA has come back normal, white blood count normal hemoglobin 13.0, have also 

reviewed chart labs from 8 days ago which included chemistries were also all 

relatively normal. Plan to send her home with 48 hour Holter monitor. Her blood 

pressures have been in the 105-110 range primarily, one reading of 98 systolic. 

She did do okay with orthostatics. But she obviously doesn't have far to drop 

her BP and she is going to be in danger of vasovagal type syncope. It could be 

that the multiple medications she is taking are contributing factors. She seems 

to have very little insight into that. requesting medication for her low back 

pain, have given Dilaudid 1 mg IM. Discharge instructions as documented.





Departure





- Departure


Time of Disposition: 19:20


Disposition: Home, Self-Care 01


Condition: Fair


Clinical Impression: 


Syncope


Qualifiers:


 Syncope type: unspecified Qualified Code(s): R55 - Syncope and collapse








- Discharge Information


Referrals: 


Nolan Driscoll MD [Primary Care Provider] - 


Forms:  ED Department Discharge


Additional Instructions: 


Rest, drink plenty of water to maintain hydration, 48 hour Holter monitor to 

monitor your cardiac activity, see Dr. Carlos in the next 2-3 days if possible 

call tomorrow morning for appointment,  lab work today does not show any 

evidence for bladder or other infection. Your hemoglobin was good at 13.0, you 

are not anemic. You had more extensive lab work done a week ago all of which 

was normal.  If you do feel yourself beginning to feel weak, lightheaded or 

dizzy you need to sit or lie down, if you are lying down you will not pass out. 

Return to ED as needed if symptoms worsening in any way.





- My Orders


Last 24 Hours: 


My Active Orders





19 18:03


Holter Monitor 48 Hours [RC] .PRN 














- Assessment/Plan


Last 24 Hours: 


My Active Orders





19 18:03


Holter Monitor 48 Hours [RC] .PRN

## 2019-04-05 NOTE — EDM.PDOC
ED HPI GENERAL MEDICAL PROBLEM





- General


Chief Complaint: Syncope


Stated Complaint: FAINTING


Time Seen by Provider: 19 21:42


Source of Information: Reports: Patient, Family


History Limitations: Reports: No Limitations





- History of Present Illness


INITIAL COMMENTS - FREE TEXT/NARRATIVE: 





The patient presents with her  for syncope.  She says this happened 4 

times tonight.  She says this has been going since February.  The first time it 

happened it happened 5 times.  She has been seen here twice and the work up has 

been normal  She had a holter monitor on for 2 days and there was nothing seen.

  She has multiple medical problems.  She has interstitial cystitis.  She said 

she felt strange before it happened.  She feels lightheaded.  She said she had 

pain before it happened.  She is out of her meds and gets refills tomorrow.  

She has no chest pain or shortness of breath.  She has no fever, chills or 

cough.  She has generalized weakness.


Onset: Sudden


Duration: Minutes:


Severity: Moderate


Improves with: Reports: None


Worsens with: Reports: None


Associated Symptoms: Denies: Chest Pain, Cough, Fever/Chills, Headaches, Nausea/

Vomiting, Shortness of Breath


  ** Flank


Pain Score (Numeric/FACES): 8





  ** Bladder


Pain Score (Numeric/FACES): 9





- Related Data


 Allergies











Allergy/AdvReac Type Severity Reaction Status Date / Time


 


codeine Allergy  Rash Verified 19 00:57


 


morphine Allergy  Hives Verified 19 00:57


 


ketorolac tromethamine AdvReac  Rash Verified 19 00:57





[From Toradol]     


 


metoclopramide HCl AdvReac  Irritabilit Verified 19 00:57





[From Reglan]   y  











Home Meds: 


 Home Meds





Baclofen 10 mg PO TID PRN 18 [History]


LORazepam [Ativan] 1 mg PO BID PRN 18 [History]


Pentosan Polysulfate Sodium [Elmiron] 100 mg PO TID 18 [History]


Solifenacin [Vesicare] 10 mg PO DAILY 18 [History]


busPIRone [Buspar] 15 mg PO DAILY 18 [History]


Citalopram Hydrobromide [Celexa] 20 mg PO DAILY 18 [History]


Gabapentin [Neurontin] 100 mg PO BEDTIME 18 [History]


Topiramate 100 mg PO BID 18 [History]


traMADol [Ultram ER] 100 mg PO BID PRN 18 [History]


Dicyclomine [Bentyl] 20 mg PO Q6H PRN #20 tablet 18 [Rx]


Phenazopyridine [Pyridium] 100 mg PO DAILY PRN 19 [History]


Potassium Chloride 20 meq PO DAILY #30 tablet.er 19 [Rx]











Past Medical History





- Past Health History


Medical/Surgical History: Denies Medical/Surgical History


HEENT History: Reports: Impaired Vision


Other HEENT History: glasses and contacts


Cardiovascular History: Reports: Syncope, Other (See Below)


Other Cardiovascular History: palpitations, irregular heartbeat due to stress.


Gastrointestinal History: Reports: Hemorrhoids


Other Gastrointestinal History: Rectal fissures. Diagnosis of irritable bowel 

syndrome intermittent constipation and diarrhea


Genitourinary History: Reports: Renal Calculus, Other (See Below)


Other Genitourinary History: endometreosis, kidney stones, reflux of urine into 

R) kidney. Recently diagnosed with interstitial cystitis of the urinary bladder.


OB/GYN History: Reports: Endometriosis, Pregnancy


Other OB/GYN History: left ovary removed and bowel surgery due to ovary embedded


Neurological History: Reports: Migraines, Other (See Below)


Other Neuro History: syncope


Psychiatric History: Reports: Anxiety, Depression


Hematologic History: Reports: Anemia


Oncologic (Cancer) History: Reports: Uterine


Dermatologic History: Reports: Eczema





- Past Surgical History


HEENT Surgical History: Reports: Oral Surgery


Other HEENT Surgeries/Procedures: wisdom teeth removed.


GI Surgical History: Reports: Lysis of Adhesions


Female  Surgical History: Reports:  Section, Hysterectomy, Kidney 

stone extraction, Oophorectomy, Ureteral Stent


Musculoskeletal Surgical History: Reports: Other (See Below)


Other Musculoskeletal Surgeries/Procedures:: dislocated lumbar disc





Social & Family History





- Family History


Family Medical History: Noncontributory





- Tobacco Use


Smoking Status *Q: Former Smoker


Used Tobacco, but Quit: Yes


Month/Year Tobacco Last Used: 5





- Caffeine Use


Caffeine Use: Reports: Coffee, Soda, Tea





- Recreational Drug Use


Recreational Drug Use: No





- Living Situation & Occupation


Living situation: Reports: , with Spouse, with Family (3 kids)


Occupation: Unemployed





ED ROS GENERAL





- Review of Systems


Review Of Systems: See Below


Constitutional: Reports: No Symptoms


HEENT: Reports: No Symptoms


Respiratory: Reports: No Symptoms


Cardiovascular: Reports: Lightheadedness, Syncope.  Denies: Chest Pain


Endocrine: Reports: No Symptoms


GI/Abdominal: Reports: No Symptoms


: Reports: No Symptoms





- Physical Exam


Exam: See Below


Exam Limited By: No Limitations


General Appearance: Alert, No Apparent Distress


Ears: Normal External Exam


Nose: Normal Inspection


Head Exam: Atraumatic, Normocephalic


Neck: Normal Inspection


Respiratory/Chest: No Respiratory Distress, Lungs Clear, Normal Breath Sounds


Cardiovascular: Regular Rate, Rhythm, No Edema, No Murmur


GI/Abdominal: Soft, Non-Tender, No Organomegaly, No Mass





EKG INTERPRETATION


EKG Date: 19


Time: 22:24


Rhythm: NSR


Rate (Beats/Min): 66


Axis: Normal


P-Wave: Present


QRS: Normal


ST-T: Normal


QT: Normal





Course





- Vital Signs


Last Recorded V/S: 


 Last Vital Signs











Temp  98.5 F   19 21:41


 


Pulse  69   19 21:41


 


Resp  20   19 21:41


 


BP  124/84   19 21:41


 


Pulse Ox  100   19 21:41














- Orders/Labs/Meds


Orders: 


 Active Orders 24 hr











 Category Date Time Status


 


 Cardiac Monitoring [RC] .AS DIRECTED Care  19 22:15 Active


 


 EKG Documentation Completion [RC] STAT Care  19 22:15 Active


 


 Peripheral IV Care [RC] .AS DIRECTED Care  19 22:15 Active


 


 Head wo Cont [CT] Stat Exams  19 22:16 Taken


 


 HYDROmorphone [Dilaudid] Med  19 00:26 Once





 0.5 mg IVPUSH ONETIME ONE   


 


 Sodium Chloride 0.9% [Normal Saline] 1,000 ml Med  19 22:15 Active





 IV .BOLUS   


 


 Sodium Chloride 0.9% [Saline Flush] Med  19 22:15 Active





 10 ml FLUSH ASDIRECTED PRN   


 


 diphenhydrAMINE [Benadryl] Med  19 00:26 Once





 50 mg IVPUSH ONETIME ONE   


 


 Peripheral IV Insertion Adult [OM.PC] Stat Oth  19 22:15 Ordered








 Medication Orders





Sodium Chloride (Normal Saline)  1,000 mls @ 1,000 mls/hr IV .BOLUS BRIA


   Last Admin: 19 22:32  Dose: 1,000 mls/hr


Sodium Chloride (Saline Flush)  10 ml FLUSH ASDIRECTED PRN


   PRN Reason: Keep Vein Open


   Last Admin: 19 22:33  Dose: 10 ml








Labs: 


 Laboratory Tests











  19 Range/Units





  23:00 23:00 


 


WBC  6.91   (3.98-10.04)  K/mm3


 


RBC  4.03   (3.98-5.22)  M/mm3


 


Hgb  12.3   (11.2-15.7)  gm/L


 


Hct  36.8   (34.1-44.9)  %


 


MCV  91.3   (79.4-94.8)  fl


 


MCH  30.5   (25.6-32.2)  pg


 


MCHC  33.4   (32.2-35.5)  g/dl


 


RDW Std Deviation  40.0   (36.4-46.3)  fL


 


Plt Count  229   (182-369)  K/mm3


 


MPV  9.8   (9.4-12.3)  fl


 


Neut % (Auto)  47.9   (34.0-71.1)  %


 


Lymph % (Auto)  39.4   (19.3-51.7)  %


 


Mono % (Auto)  7.7   (4.7-12.5)  %


 


Eos % (Auto)  4.1   (0.7-5.8)  


 


Baso % (Auto)  0.3   (0.1-1.2)  %


 


Neut # (Auto)  3.32   (1.56-6.13)  K/mm3


 


Lymph # (Auto)  2.72   (1.18-3.74)  K/mm3


 


Mono # (Auto)  0.53 H   (0.24-0.36)  K/mm3


 


Eos # (Auto)  0.28   (0.04-0.36)  K/mm3


 


Baso # (Auto)  0.02   (0.01-0.08)  K/mm3


 


Sodium   140  (136-145)  mEq/L


 


Potassium   3.3 L  (3.5-5.1)  mEq/L


 


Chloride   106  ()  mEq/L


 


Carbon Dioxide   21  (21-32)  mEq/L


 


Anion Gap   16.3 H  (5-15)  


 


BUN   15  (7-18)  mg/dL


 


Creatinine   0.8  (0.55-1.02)  mg/dL


 


Est Cr Clr Drug Dosing   85.06  mL/min


 


Estimated GFR (MDRD)   > 60  (>60)  mL/min


 


BUN/Creatinine Ratio   18.8 H  (14-18)  


 


Glucose   79  ()  mg/dL


 


Calcium   8.1 L  (8.5-10.1)  mg/dL


 


Magnesium   1.7 L  (1.8-2.4)  mg/dl


 


Total Bilirubin   0.4  (0.2-1.0)  mg/dL


 


AST   15  (15-37)  U/L


 


ALT   25  (14-59)  U/L


 


Alkaline Phosphatase   39 L  ()  U/L


 


Troponin I   < 0.017  (0.00-0.056)  ng/mL


 


Total Protein   6.5  (6.4-8.2)  g/dl


 


Albumin   3.5  (3.4-5.0)  g/dl


 


Globulin   3.0  gm/dL


 


Albumin/Globulin Ratio   1.2  (1-2)  











Meds: 


Medications











Generic Name Dose Route Start Last Admin





  Trade Name Freq  PRN Reason Stop Dose Admin


 


Sodium Chloride  1,000 mls @ 1,000 mls/hr  19 22:15  19 22:32





  Normal Saline  IV   1,000 mls/hr





  .BOLUS BRIA   Administration





     





     





     





     


 


Sodium Chloride  10 ml  19 22:15  19 22:33





  Saline Flush  FLUSH   10 ml





  ASDIRECTED PRN   Administration





  Keep Vein Open   





     





     





     














Discontinued Medications














Generic Name Dose Route Start Last Admin





  Trade Name Freq  PRN Reason Stop Dose Admin


 


Hydromorphone HCl  1 mg  19 23:05  19 23:19





  Dilaudid  IVPUSH  19 23:06  1 mg





  ONETIME ONE   Administration





     





     





     





     














- Re-Assessments/Exams


Free Text/Narrative Re-Assessment/Exam: 





19 23:38


I ordered an IV NS 1L bolus, dilaudid 1mg IV, EKG, labs, CT of the head.


19 00:27


Her EKG shows a NSR with no acute changes.  The CT of her head shows nothing 

acute.  Her CBC looks good.  Her K was a little low at 3.3.  Her troponin was 

negative.  I do not see a reason for her syncope today.  She does see 

cardiology next month.  She has more pain I will give her a little more 

dilaudid and benadryl and discharge her home.





Departure





- Departure


Time of Disposition: 00:35


Disposition: Home, Self-Care 01


Condition: Good


Clinical Impression: 


Syncope


Qualifiers:


 Syncope type: unspecified Qualified Code(s): R55 - Syncope and collapse








- Discharge Information


*PRESCRIPTION DRUG MONITORING PROGRAM REVIEWED*: Not Applicable


*COPY OF PRESCRIPTION DRUG MONITORING REPORT IN PATIENT CARL[facility]: Not Applicable


Prescriptions: 


Potassium Chloride 20 meq PO DAILY #30 tablet.er


Referrals: 


Santino Coppola MD [Primary Care Provider] - 1 Week


Forms:  ED Department Discharge


Additional Instructions: 


Take your medication as prescribed.  Take the potassium daily.  Follow up with 

Dr Coppola.  Please return if you are worse.





- My Orders


Last 24 Hours: 


My Active Orders





19 22:15


Cardiac Monitoring [RC] .AS DIRECTED 


EKG Documentation Completion [RC] STAT 


Peripheral IV Care [RC] .AS DIRECTED 


Sodium Chloride 0.9% [Normal Saline] 1,000 ml IV .BOLUS 


Sodium Chloride 0.9% [Saline Flush]   10 ml FLUSH ASDIRECTED PRN 


Peripheral IV Insertion Adult [OM.PC] Stat 





19 22:16


Head wo Cont [CT] Stat 





19 00:26


HYDROmorphone [Dilaudid]   0.5 mg IVPUSH ONETIME ONE 


diphenhydrAMINE [Benadryl]   50 mg IVPUSH ONETIME ONE 














- Assessment/Plan


Last 24 Hours: 


My Active Orders





19 22:15


Cardiac Monitoring [RC] .AS DIRECTED 


EKG Documentation Completion [RC] STAT 


Peripheral IV Care [RC] .AS DIRECTED 


Sodium Chloride 0.9% [Normal Saline] 1,000 ml IV .BOLUS 


Sodium Chloride 0.9% [Saline Flush]   10 ml FLUSH ASDIRECTED PRN 


Peripheral IV Insertion Adult [OM.PC] Stat 





19 22:16


Head wo Cont [CT] Stat 





19 00:26


HYDROmorphone [Dilaudid]   0.5 mg IVPUSH ONETIME ONE 


diphenhydrAMINE [Benadryl]   50 mg IVPUSH ONETIME ONE

## 2019-04-06 NOTE — CT
Head CT

 

Technique: Multiple axial sections through the brain were obtained.  

Intravenous contrast was not utilized.

 

Comparison: No prior intracranial imaging.

 

Findings: Ventricles along with basal cisterns and sulci over the 

convexities appear within normal limits for the patient's age.  No 

abnormal parenchymal densities are seen.  No evidence of intracranial 

hemorrhage.  No midline shift or mass effect is seen.

 

Bone window settings were reviewed which show no acute calvarial 

abnormality.  Visualized sinuses are clear.

 

Impression:

1.  Nothing acute is seen on noncontrast head CT exam.

 

Diagnostic code #1

 

I agree with preliminary report from [facility], finalized on 04/06/19, 

12:07 AM Central Time

## 2019-06-06 NOTE — EDM.PDOC
ED HPI GENERAL MEDICAL PROBLEM





- General


Chief Complaint: ENT Problem


Stated Complaint: EARS AND THROAT PAIN


Time Seen by Provider: 19 00:03





- History of Present Illness


INITIAL COMMENTS - FREE TEXT/NARRATIVE: 





28-year-old female presents emergency room with sore throat ear pain she's also 

achy all over feels weak and tired





This started couple of days ago progressively getting worse. Her voice is 

starting to get hoarse and difficult almost sounds like laryngitis.. She does 

not have any breathing difficulty shortness of breath or cough not a lot of 

nasal congestion or sinus pressure. No significant fevers but she hasn't 

checked it at home.


  ** Throat


Pain Score (Numeric/FACES): 9





- Related Data


 Allergies











Allergy/AdvReac Type Severity Reaction Status Date / Time


 


codeine Allergy  Rash Verified 19 23:08


 


morphine Allergy  Hives Verified 19 23:08


 


ketorolac tromethamine AdvReac  Rash Verified 19 23:08





[From Toradol]     


 


metoclopramide HCl AdvReac  Irritabilit Verified 19 23:08





[From Reglan]   y  


 


MRI contrast Allergy  Airway Uncoded 19 23:08





   Tightness  











Home Meds: 


 Home Meds





Baclofen 10 mg PO TID PRN 18 [History]


LORazepam [Ativan] 1 mg PO BID PRN 18 [History]


Pentosan Polysulfate Sodium [Elmiron] 100 mg PO TID 18 [History]


Solifenacin [Vesicare] 10 mg PO DAILY 18 [History]


busPIRone [Buspar] 15 mg PO DAILY 18 [History]


Citalopram Hydrobromide [Celexa] 20 mg PO DAILY 18 [History]


Gabapentin [Neurontin] 100 mg PO BEDTIME 18 [History]


Topiramate 100 mg PO BID 18 [History]


traMADol [Ultram ER] 100 mg PO BID PRN 18 [History]


Dicyclomine [Bentyl] 20 mg PO Q6H PRN #20 tablet 18 [Rx]


Phenazopyridine [Pyridium] 100 mg PO DAILY PRN 19 [History]


Potassium Chloride 20 meq PO DAILY #30 tablet.er 19 [Rx]











Past Medical History





- Past Health History


Medical/Surgical History: Denies Medical/Surgical History


HEENT History: Reports: Impaired Vision


Other HEENT History: glasses and contacts


Cardiovascular History: Reports: Syncope, Other (See Below)


Other Cardiovascular History: palpitations, irregular heartbeat due to stress.


Gastrointestinal History: Reports: Hemorrhoids


Other Gastrointestinal History: Rectal fissures. Diagnosis of irritable bowel 

syndrome intermittent constipation and diarrhea


Genitourinary History: Reports: Renal Calculus, Other (See Below)


Other Genitourinary History: endometreosis, kidney stones, reflux of urine into 

R) kidney. Recently diagnosed with interstitial cystitis of the urinary bladder.


OB/GYN History: Reports: Endometriosis, Pregnancy


Other OB/GYN History: left ovary removed and bowel surgery due to ovary embedded


Neurological History: Reports: Migraines, Other (See Below)


Other Neuro History: syncope


Psychiatric History: Reports: Anxiety, Depression


Hematologic History: Reports: Anemia


Oncologic (Cancer) History: Reports: Uterine


Dermatologic History: Reports: Eczema





- Past Surgical History


HEENT Surgical History: Reports: Oral Surgery


Other HEENT Surgeries/Procedures: wisdom teeth removed.


GI Surgical History: Reports: Lysis of Adhesions


Female  Surgical History: Reports:  Section, Hysterectomy, Kidney 

stone extraction, Oophorectomy, Ureteral Stent


Musculoskeletal Surgical History: Reports: Other (See Below)


Other Musculoskeletal Surgeries/Procedures:: dislocated lumbar disc





Social & Family History





- Family History


Family Medical History: Noncontributory





- Tobacco Use


Smoking Status *Q: Former Smoker


Used Tobacco, but Quit: Yes


Month/Year Tobacco Last Used: months ago





- Caffeine Use


Caffeine Use: Reports: None





- Recreational Drug Use


Recreational Drug Use: No





- Living Situation & Occupation


Living situation: Reports: , with Spouse, with Family (3 kids)


Occupation: Unemployed





ED ROS ENT





- Review of Systems


Review Of Systems: See Below


Constitutional: Reports: No Symptoms


HEENT: Reports: Ear Pain, Throat Pain


Respiratory: Reports: No Symptoms


Cardiovascular: Reports: No Symptoms


Endocrine: Reports: No Symptoms


GI/Abdominal: Reports: No Symptoms


: Reports: No Symptoms


Musculoskeletal: Reports: No Symptoms


Skin: Reports: No Symptoms


Neurological: Reports: No Symptoms





ED EXAM, ENT





- Physical Exam


Exam: See Below


Exam Limited By: No Limitations


General Appearance: Alert, No Apparent Distress


Eye Exam: Bilateral Eye: Normal Inspection


Nose: Normal Inspection, Normal Mucousa, No Blood


Mouth/Throat: Normal Inspection, Normal Gums, Normal Lips, Normal Teeth, 

Pharyngeal Erythema, Tonsillar Exudates, Tonsillar Swelling.  No: Normal 

Oropharynx, Uvular Deviation, Uvular Edema


Head: Atraumatic, Normocephalic


Neck: Normal Inspection, Supple, Non-Tender, Full Range of Motion


Cardiovascular: Normal Peripheral Pulses, Regular Rate, Rhythm, No Edema, No 

Gallop, No JVD, No Murmur, No Rub


GI/Abdominal: Normal Bowel Sounds, Soft, Non-Tender, No Organomegaly, No 

Distention, No Abnormal Bruit, No Mass


Neurological: Alert, Oriented, Normal Cognition





Course





- Vital Signs


Last Recorded V/S: 


 Last Vital Signs











Temp  38.0 C   19 23:10


 


Pulse  101 H  19 23:10


 


Resp  16   19 23:10


 


BP  101/67   19 23:10


 


Pulse Ox  100   19 23:10














- Orders/Labs/Meds


Labs: 


 Laboratory Tests











  19 Range/Units





  00:25 


 


Monoscreen  Negative  (NEGATIVE)  











Meds: 


Medications














Discontinued Medications














Generic Name Dose Route Start Last Admin





  Trade Name Freq  PRN Reason Stop Dose Admin


 


Ampicillin Sodium  Confirm  19 01:38  19 01:44





  Ampicillin  Administered  19 01:39  Not Given





  Dose   





  1 gm   





  .ROUTE   





  .STK-MED ONE   





     





     





     





     


 


Ampicillin Sodium  Confirm  19 01:39  19 01:44





  Ampicillin  Administered  19 01:40  Not Given





  Dose   





  1 gm   





  .ROUTE   





  .STK-MED ONE   





     





     





     





     


 


Hydromorphone HCl  0.5 mg  19 02:40  19 02:45





  Dilaudid  IVPUSH  19 02:41  0.5 mg





  ONETIME ONE   Administration





     





     





     





     


 


Hydromorphone HCl  Confirm  19 02:43  19 02:47





  Dilaudid  Administered  19 02:44  Not Given





  Dose   





  0.5 mg   





  .ROUTE   





  .STK-MED ONE   





     





     





     





     


 


Ampicillin Sodium 1 gm/ Sodium  50 mls @ 100 mls/hr  19 01:30  19 01

:50





  Chloride  IV   100 mls/hr





  NOW BRIA   Administration





     





     





     





     


 


Lactated Ringer's  1,000 mls @ 999 mls/hr  19 01:27  19 01:36





  Ringers, Lactated  IV  19 02:27  999 mls/hr





  .BOLUS ONE   Administration





     





     





     





     


 


Sodium Chloride  Confirm  19 01:41  19 01:44





  Normal Saline  Administered  19 01:42  Not Given





  Dose   





  50 mls @ as directed   





  .ROUTE   





  .[facility]-MED ONE   





     





     





     





     














- Re-Assessments/Exams


Free Text/Narrative Re-Assessment/Exam: 





19 01:29


Patient's rapid strep was positive Monospot negative patient is concerned she 

is not committable to swallow the medication and she's not been able take her 

bladder medication will give her a liter of fluid give her first dose of 

amoxicillin in this case ampicillin 1 g IV now.


19 03:28


The patient during her stay was somewhat demanding on pain medication and kept 

going on about how she hadn't slept the last several nights   ears and throat 

her and how difficult it was to swallow finally gave her half milligram of a 

lot of men soon after she wanted Benadryl because the ampicillin cause some 

irritation in her veins informed her the Benadryl with not be of benefit with 

this and could potentially have an additive effect with sedation with the 

Dilaudid should just received. She received most of her IV fluids and the IV 

was DC'd at that time and the patient discharged on oral amoxicillin patient 

assured us that she would secure a ride to her home and set it up to her 

 will pick her up





Departure





- Departure


Time of Disposition: 03:16


Disposition: Home, Self-Care 01


Clinical Impression: 


 Strep pharyngitis








- Discharge Information


Instructions:  Strep Throat, Easy-to-Read


Referrals: 


Santino Coppola MD [Primary Care Provider] - 


Forms:  ED Department Discharge


Additional Instructions: 


Take the amoxicillin one 3 times a day until all gone. 





Use over-the-counter ibuprofen or Tylenol as needed for pain control use sugar-

free hard candies to soothe your throat follow-up with your regular doctor 

early next week if needed. 





Return to the emergency room with any questions or problems

## 2020-01-10 ENCOUNTER — HOSPITAL ENCOUNTER (EMERGENCY)
Dept: HOSPITAL 41 - JD.ED | Age: 30
Discharge: HOME | End: 2020-01-10
Payer: COMMERCIAL

## 2020-01-10 VITALS — HEART RATE: 81 BPM | DIASTOLIC BLOOD PRESSURE: 83 MMHG | SYSTOLIC BLOOD PRESSURE: 128 MMHG

## 2020-01-10 DIAGNOSIS — Z88.6: ICD-10-CM

## 2020-01-10 DIAGNOSIS — F41.9: ICD-10-CM

## 2020-01-10 DIAGNOSIS — Z79.899: ICD-10-CM

## 2020-01-10 DIAGNOSIS — F32.9: ICD-10-CM

## 2020-01-10 DIAGNOSIS — Z88.5: ICD-10-CM

## 2020-01-10 DIAGNOSIS — R30.0: Primary | ICD-10-CM

## 2020-01-10 DIAGNOSIS — Z88.8: ICD-10-CM

## 2020-01-10 DIAGNOSIS — Z91.041: ICD-10-CM

## 2020-01-10 PROCEDURE — 99283 EMERGENCY DEPT VISIT LOW MDM: CPT

## 2020-01-10 PROCEDURE — 81001 URINALYSIS AUTO W/SCOPE: CPT

## 2020-01-10 PROCEDURE — 87086 URINE CULTURE/COLONY COUNT: CPT

## 2020-01-10 PROCEDURE — 96372 THER/PROPH/DIAG INJ SC/IM: CPT

## 2020-01-10 NOTE — EDM.PDOC
ED HPI GENERAL MEDICAL PROBLEM





- General


Chief Complaint: Genitourinary Problem


Stated Complaint: PAIN IN BLADDER


Time Seen by Provider: 01/10/20 19:33


Source of Information: Reports: Patient, RN Notes Reviewed


History Limitations: Reports: No Limitations





- History of Present Illness


INITIAL COMMENTS - FREE TEXT/NARRATIVE: 





Patient is a 29-year-old female who presents to the ED for evaluation of 

pressure and pain in her bladder.  The patient notes she has a history of 

multiple bladder surgeries with chronic bladder issues.  She states that over 

the last 2 days she has had increased urinary frequency, increased bladder 

pressure, and some slight burning with urination.  She notes that she was 

recently in [place], and was evaluated over [place] time for a sore throat

, and she was started on amoxicillin for sore throat, she states that she did 

have her urine tested at that time as well, and she was found to have a UTI, 

and the provider gave her a prescription for Macrobid, but she did not fill 

this as she states she has chronic bacteria in her urine and she did not have 

any UTI-like symptoms.  Patient notes that she has been having some mild hot 

flashes, she cannot say if she is had a fever or chills.  She states she has 

been drinking lots of water, as this makes her bladder pain feel better.  She 

did take 2 tramadol today, and 2 Tylenol for pain, and was using a heating pad 

as well.  Patient notes she has had a hysterectomy, and retains only one ovary.

  She still has her appendix at this time.  She has had multiple laparoscopies 

as well.  Patient states she is monogamous with her  this is not worried 

about any sort of STI at this time.  Primary care provider is Dr. Santino Coppola, 

patient is a G3, P3.


  ** Bladder


Pain Score (Numeric/FACES): 10





- Related Data


 Allergies











Allergy/AdvReac Type Severity Reaction Status Date / Time


 


codeine Allergy  Rash Verified 01/10/20 19:41


 


morphine Allergy  Hives Verified 01/10/20 19:41


 


ketorolac tromethamine AdvReac  Rash Verified 01/10/20 19:41





[From Toradol]     


 


metoclopramide HCl AdvReac  Irritabilit Verified 01/10/20 19:41





[From Reglan]   y  


 


MRI contrast Allergy  Airway Uncoded 19 23:08





   Tightness  











Home Meds: 


 Home Meds





LORazepam [Ativan] 1 mg PO BID PRN 18 [History]


Pentosan Polysulfate Sodium [Elmiron] 100 mg PO TID 18 [History]


Solifenacin [Vesicare] 10 mg PO DAILY 18 [History]


busPIRone [Buspar] 15 mg PO DAILY 18 [History]


Citalopram Hydrobromide [Celexa] 20 mg PO DAILY 18 [History]


Gabapentin [Neurontin] 100 mg PO BEDTIME 18 [History]


Topiramate 100 mg PO BID 18 [History]


traMADol [Ultram ER] 100 mg PO BID PRN 18 [History]


Baclofen 20 mg PO BID 01/10/20 [History]











Past Medical History


HEENT History: Reports: Impaired Vision


Other HEENT History: glasses and contacts


Cardiovascular History: Reports: Syncope, Other (See Below)


Other Cardiovascular History: palpitations, irregular heartbeat due to stress.


Gastrointestinal History: Reports: Hemorrhoids


Other Gastrointestinal History: Rectal fissures. Diagnosis of irritable bowel 

syndrome intermittent constipation and diarrhea


Genitourinary History: Reports: Renal Calculus, Other (See Below)


Other Genitourinary History: endometreosis, kidney stones, reflux of urine into 

R) kidney. Recently diagnosed with interstitial cystitis of the urinary bladder.


OB/GYN History: Reports: Endometriosis, Pregnancy


: 3


Para: 3


Other OB/GYN History: left ovary removed and bowel surgery due to ovary embedded


Neurological History: Reports: Migraines, Other (See Below)


Other Neuro History: syncope


Psychiatric History: Reports: Anxiety, Depression


Hematologic History: Reports: Anemia


Oncologic (Cancer) History: Reports: Uterine


Dermatologic History: Reports: Eczema





- Past Surgical History


HEENT Surgical History: Reports: Oral Surgery


Other HEENT Surgeries/Procedures: wisdom teeth removed.


GI Surgical History: Reports: Lysis of Adhesions


Female  Surgical History: Reports:  Section, Hysterectomy, Kidney 

stone extraction, Oophorectomy, Ureteral Stent


Musculoskeletal Surgical History: Reports: Other (See Below)


Other Musculoskeletal Surgeries/Procedures:: dislocated lumbar disc





Social & Family History





- Family History


Family Medical History: Noncontributory





- Tobacco Use


Smoking Status *Q: Never Smoker





- Caffeine Use


Caffeine Use: Reports: None





- Recreational Drug Use


Recreational Drug Use: No





- Living Situation & Occupation


Living situation: Reports: , with Spouse, with Family (3 kids)


Occupation: Unemployed





ED ROS GENERAL





- Review of Systems


Review Of Systems: See Below


Constitutional: Reports: Decreased Appetite.  Denies: Fever, Chills


Respiratory: Denies: Shortness of Breath


Cardiovascular: Denies: Chest Pain


GI/Abdominal: Reports: Abdominal Pain (suprapubic pain), Decreased Appetite.  

Denies: Constipation, Diarrhea, Nausea, Vomiting


: Reports: Dysuria, Frequency, Pain (suprapubic pain).  Denies: Discharge, 

Flank Pain, Urgency


Psychiatric: Reports: Anxiety





ED EXAM, RENAL/





- Physical Exam


Exam: See Below


Exam Limited By: No Limitations


General Appearance: Alert, WD/WN, No Apparent Distress, Anxious


Eye Exam: Bilateral Eye: EOMI, Normal Inspection, PERRL


Throat/Mouth: Normal Inspection, Normal Lips, Normal Teeth, Normal Gums, Normal 

Oropharynx, Normal Voice, No Airway Compromise


Head: Atraumatic, Normocephalic


Neck: Normal Inspection


Respiratory/Chest: No Respiratory Distress, Lungs Clear, Normal Breath Sounds, 

No Accessory Muscle Use, Chest Non-Tender


Cardiovascular: Normal Peripheral Pulses, Regular Rate, Rhythm, No Edema, No 

Murmur


GI/Abdominal: Normal Bowel Sounds, Soft, No Organomegaly, No Distention, No Mass

, Tender (suprapubic tenderness)


 (Female) Exam: Deferred


Extremities: Normal Inspection, Normal Capillary Refill


Neurological: Alert, Oriented, Normal Cognition, No Motor/Sensory Deficits


Psychiatric: Normal Affect, Normal Mood, Anxious (pt talks in quick hector, is 

mildly tearful on exam.)


Skin Exam: Warm, Dry, Intact, Normal Color, No Rash





Course





- Vital Signs


Last Recorded V/S: 


 Last Vital Signs











Temp  98 F   01/10/20 19:38


 


Pulse  81   01/10/20 19:38


 


Resp  15   01/10/20 19:38


 


BP  128/83   01/10/20 19:38


 


Pulse Ox  98   01/10/20 19:38














- Orders/Labs/Meds


Orders: 


 Active Orders 24 hr











 Category Date Time Status


 


 CULTURE URINE [RM] Routine Lab  01/10/20 19:44 Results











Labs: 


 Laboratory Tests











  01/10/20 Range/Units





  19:44 


 


Urine Color  Yellow  (Yellow)  


 


Urine Appearance  Clear  (Clear)  


 


Urine pH  6.0  (5.0-8.0)  


 


Ur Specific Gravity  1.025  (1.005-1.030)  


 


Urine Protein  Negative  (Negative)  


 


Urine Glucose (UA)  Negative  (Negative)  


 


Urine Ketones  Negative  (Negative)  


 


Urine Occult Blood  Negative  (Negative)  


 


Urine Nitrite  Negative  (Negative)  


 


Urine Bilirubin  Negative  (Negative)  


 


Urine Urobilinogen  0.2  (0.2-1.0)  


 


Ur Leukocyte Esterase  Negative  (Negative)  


 


Urine RBC  0-5  (0-5)  /hpf


 


Urine WBC  0-5  (0-5)  /hpf


 


Ur Squamous Epith Cells  5-10 H  (0-5)  /hpf


 


Urine Bacteria  Few  (FEW)  /hpf


 


Urine Mucus  Few  (FEW)  /hpf











Meds: 


Medications














Discontinued Medications














Generic Name Dose Route Start Last Admin





  Trade Name Toddq  PRN Reason Stop Dose Admin


 


Diphenhydramine HCl  25 mg  01/10/20 21:54  01/10/20 22:00





  Benadryl  IM  01/10/20 21:55  25 mg





  ONETIME ONE   Administration





     





     





     





     


 


Hydromorphone HCl  0.5 mg  01/10/20 21:54  01/10/20 22:04





  Dilaudid  IM  01/10/20 21:55  0.5 mg





  ONETIME ONE   Administration





     





     





     





     














- Re-Assessments/Exams


Free Text/Narrative Re-Assessment/Exam: 





01/10/20 20:08


Patient presents to the ED for evaluation of bladder pressure.  At this time we 

will check a urinalysis for initial management.  Does appear that the patient 

might have a UTI.  Will likely send her home with antibiotics and have her 

follow-up with her regular doctor.





01/10/20 20:43


Patient's urinalysis is negative for any sort of bacterial infection, that 

would be suggestive of a UTI.  We will have her follow-up with her regular 

doctor for further management of her symptoms.  Patient will be directed take 

some Azo for urinary relief if she believes this should help her.





01/10/20 22:00


I was called back to the room after the patient was discharged, she states that 

she was in quite a bit of pain, this is normal for a "flare" of her bladder 

symptoms.  At this time I did order 25 mg IM Benadryl and 0.5 mg IM Dilaudid 

for pain management.  She will be directed to take her other medications as 

directed at home for further pain relief.  The patient wanted the medication 

given IV, however I did not see any indication to start an IV to give any sort 

of medications at this time.  After the medication was given, the patient 

waited around for 20 to 30 minutes, and states that she did not get much pain 

relief and was wanting more pain medication, I do not feel this was appropriate 

as it did not appear that she was having any sort of life-threatening illness 

at tonight's visit, we did not give her anymore pain medication, and she was 

discharged as previously directed.





Departure





- Departure


Time of Disposition: 20:44


Disposition: Home, Self-Care 01


Condition: Fair


Clinical Impression: 


 Dysuria








- Discharge Information


*PRESCRIPTION DRUG MONITORING PROGRAM REVIEWED*: No


*COPY OF PRESCRIPTION DRUG MONITORING REPORT IN PATIENT CARLOS: No


Instructions:  Dysuria


Referrals: 


Santino Coppola MD [Primary Care Provider] - 


Forms:  ED Department Discharge


Additional Instructions: 


You have been evaluated in the ED for your urinary symptoms.





Your urinalysis did not show any sign of an acute bacterial infection at today'

s visit.  I will however send this for culture for confirmation, you will be 

called and made notified if you should need antibiotics.





You may take AZO for urinary pain relief. This is available over the counter, 

and can be attained at any retail store like [employer] or any pharmacy.  Please 

be aware that this medication will make your urine turn orange.





Please increase your oral fluid intake and try to stay adequately hydrated.  

Recommend that you follow-up with your primary care provider within the next 

week or so to make sure that your symptoms are resolving.  And also for the 

results of your urine culture.





Please return to the ED if your symptoms change or worsen.





Sepsis Event Note





- Evaluation


Sepsis Screening Result: No Definite Risk





- Focused Exam


Date Exam was Performed: 20


Time Exam was Performed: 11:51





- My Orders


Last 24 Hours: 


My Active Orders





01/10/20 19:44


CULTURE URINE [RM] Routine 














- Assessment/Plan


Last 24 Hours: 


My Active Orders





01/10/20 19:44


CULTURE URINE [RM] Routine

## 2021-01-17 ENCOUNTER — HOSPITAL ENCOUNTER (EMERGENCY)
Dept: HOSPITAL 41 - JD.ED | Age: 31
Discharge: HOME | End: 2021-01-17
Payer: MEDICAID

## 2021-01-17 VITALS — HEART RATE: 82 BPM | DIASTOLIC BLOOD PRESSURE: 76 MMHG | SYSTOLIC BLOOD PRESSURE: 100 MMHG

## 2021-01-17 DIAGNOSIS — S93.601A: Primary | ICD-10-CM

## 2021-01-17 DIAGNOSIS — Z79.899: ICD-10-CM

## 2021-01-17 DIAGNOSIS — Z91.041: ICD-10-CM

## 2021-01-17 DIAGNOSIS — Z88.8: ICD-10-CM

## 2021-01-17 DIAGNOSIS — Z88.6: ICD-10-CM

## 2021-01-17 DIAGNOSIS — Z88.5: ICD-10-CM
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
## 2021-01-17 PROCEDURE — 72100 X-RAY EXAM L-S SPINE 2/3 VWS: CPT

## 2021-01-17 PROCEDURE — 73502 X-RAY EXAM HIP UNI 2-3 VIEWS: CPT

## 2021-01-17 PROCEDURE — 99283 EMERGENCY DEPT VISIT LOW MDM: CPT

## 2021-01-17 PROCEDURE — 73630 X-RAY EXAM OF FOOT: CPT

## 2021-01-17 PROCEDURE — 96372 THER/PROPH/DIAG INJ SC/IM: CPT

## 2021-01-17 PROCEDURE — 73610 X-RAY EXAM OF ANKLE: CPT

## 2021-01-17 NOTE — CR
Right ankle: 4 views centered to the right ankle were obtained.

 

Comparison: No prior ankle study is available.

 

Ankle mortise is symmetric.  No acute fracture, dislocation or other 

bony abnormality is appreciated.

 

Impression:

1.  Nothing acute is appreciated on right ankle exam.

 

Diagnostic code #1

## 2021-01-17 NOTE — CR
Pelvis and right hip: AP view of the pelvis was obtained as well as AP

 and frog leg lateral views of the right hip.

 

Comparison: Prior AP pelvis study of 11/06/17.

 

Joint spaces within both hips are preserved.  Sacroiliac joints are 

normal.  No acute fracture or other bony abnormality is appreciated.  

Calcification is seen overlying the right sacrum which is stable from 

prior exam and either due to phlebolith or bone island.

 

Impression:

1.  Nothing acute is appreciated on AP pelvis or 2 view right hip 

exam.

 

Diagnostic code #2

## 2021-01-17 NOTE — CR
Lumbar spine: AP, lateral and coned-down lateral views centered to the

 lumbosacral junction were obtained.

 

Comparison: Prior lumbar spine study of 11/06/17.

 

Rudimentary disc is seen at the lumbosacral junction.  Other disc 

spaces are maintained.  Vertebral body heights are maintained.  

Pedicles are intact.  Visualized transverse and spinous processes are 

intact.  Sacroiliac joints are normal.  Very minimal scattered 

endplate osteophytes are seen.

 

Impression:

1.  Incidental finding as noted above.

2.  Nothing acute is seen.

 

Diagnostic code #2

## 2021-01-17 NOTE — CR
Right foot: 4 views of the right foot were obtained.

 

Comparison: No prior foot exam is available.

 

Joint spaces are preserved.  No acute fracture, dislocation or other 

bony abnormality is appreciated.

 

Impression:

1.  No acute bony abnormality is identified on right foot exam.

 

Diagnostic code #1

## 2021-01-17 NOTE — EDM.PDOC
ED HPI GENERAL MEDICAL PROBLEM





- General


Chief Complaint: Lower Extremity Injury/Pain


Stated Complaint: FALL/R FOOT INJURY


Time Seen by Provider: 21 13:03


Source of Information: Reports: Patient, RN Notes Reviewed


History Limitations: Reports: No Limitations





- History of Present Illness


INITIAL COMMENTS - FREE TEXT/NARRATIVE: 





Patient is a 30-year-old female who presents to the ED for her right foot 

injury.  Patient notes she had to pick her son up late last night at a 

sleepover, as he was too scared to sleep over and he took his shoes off near the

door, she went to lock the door and ended up slipping on his shoes and falling 

down 10 stairs and landing on the lateral portion of her right foot.  She is 

complaining of pain that originates in her right foot, up to her right ankle, 

and seems to radiate all the way up to her back.  She does note prior back 

injury, that she needs surgery for.  She has not taken any medications for the 

pain.  She states she is feeling somewhat nauseous due to the pain as well.  She

is not been able to bear much weight on the extremity.  Patient denies any other

sick-like symptoms, fever/chills, cough/shortness of breath, nausea/vo

miting/diarrhea.





  ** Right Foot


Pain Score (Numeric/FACES): 10





- Related Data


                                    Allergies











Allergy/AdvReac Type Severity Reaction Status Date / Time


 


codeine Allergy  Rash Verified 21 13:01


 


morphine Allergy  Hives Verified 21 13:01


 


ketorolac tromethamine AdvReac  Rash Verified 21 13:01





[From Toradol]     


 


metoclopramide HCl AdvReac  Irritabilit Verified 21 13:01





[From Reglan]   y  


 


MRI contrast Allergy  Airway Uncoded 21 13:01





   Tightness  











Home Meds: 


                                    Home Meds





LORazepam [Ativan] 1 mg PO BID PRN 18 [History]


Pentosan Polysulfate Sodium [Elmiron] 100 mg PO TID 18 [History]


Solifenacin [Vesicare] 10 mg PO DAILY 18 [History]


busPIRone [Buspar] 15 mg PO DAILY 18 [History]


Citalopram Hydrobromide [Celexa] 20 mg PO DAILY 18 [History]


Gabapentin [Neurontin] 100 mg PO BEDTIME 18 [History]


Topiramate 100 mg PO BID 18 [History]


traMADol [Ultram ER] 100 mg PO BID PRN 18 [History]


Baclofen 20 mg PO BID 01/10/20 [History]











Past Medical History


HEENT History: Reports: Impaired Vision


Other HEENT History: glasses and contacts


Cardiovascular History: Reports: Syncope, Other (See Below)


Other Cardiovascular History: palpitations, irregular heartbeat due to stress.


Gastrointestinal History: Reports: Hemorrhoids


Other Gastrointestinal History: Rectal fissures. Diagnosis of irritable bowel 

syndrome intermittent constipation and diarrhea


Genitourinary History: Reports: Renal Calculus, Other (See Below)


Other Genitourinary History: endometreosis, kidney stones, reflux of urine into 

R) kidney. Recently diagnosed with interstitial cystitis of the urinary bladder.


OB/GYN History: Reports: Endometriosis, Pregnancy


Other OB/GYN History: left ovary removed and bowel surgery due to ovary embedded


Neurological History: Reports: Migraines, Other (See Below)


Other Neuro History: syncope


Psychiatric History: Reports: Anxiety, Depression


Hematologic History: Reports: Anemia


Oncologic (Cancer) History: Reports: Uterine


Dermatologic History: Reports: Eczema





- Past Surgical History


HEENT Surgical History: Reports: Oral Surgery


Other HEENT Surgeries/Procedures: wisdom teeth removed.


GI Surgical History: Reports: Lysis of Adhesions


Female  Surgical History: Reports:  Section, Hysterectomy, Kidney 

stone extraction, Oophorectomy, Ureteral Stent


Musculoskeletal Surgical History: Reports: Other (See Below)


Other Musculoskeletal Surgeries/Procedures:: dislocated lumbar disc





Social & Family History





- Family History


Family Medical History: No Pertinent Family History





- Caffeine Use


Caffeine Use: Reports: None





- Living Situation & Occupation


Living situation: Reports: , with Spouse, with Family (3 kids)


Occupation: Unemployed





Review of Systems





- Review of Systems


Review Of Systems: Comprehensive ROS is negative, except as noted in HPI.





ED EXAM, GENERAL





- Physical Exam


Exam: See Below


Exam Limited By: No Limitations


General Appearance: Alert, WD/WN, No Apparent Distress


Respiratory/Chest: No Respiratory Distress, Lungs Clear, Normal Breath Sounds, 

No Accessory Muscle Use, Chest Non-Tender


Cardiovascular: Normal Peripheral Pulses, Regular Rate, Rhythm, No Edema


Peripheral Pulses: 2+: Dorsalis Pedis (L), Dorsalis Pedis (R)


Extremities: Normal Capillary Refill, Limited Range of Motion (of right 

ankle/foot d/t pain. there is 4x5cm bruise to the R lateral midfoot area.)


Neurological: Alert, Oriented, Normal Cognition, No Motor/Sensory Deficits


Psychiatric: Normal Affect


Skin Exam: Warm, Dry, Intact, No Rash, Ecchymosis (as noted in extremities 

documentation.)





Course





- Vital Signs


Last Recorded V/S: 


                                Last Vital Signs











Temp  98.4 F   21 12:59


 


Pulse  82   21 12:59


 


Resp      


 


BP  100/76   21 12:59


 


Pulse Ox      














- Orders/Labs/Meds


Orders: 


                               Active Orders 24 hr











 Category Date Time Status


 


 Hip Min 2V or 3V w Pelvis Rt [CR] Stat Exams  21 14:38 Taken


 


 Lumbar Spine 2 or 3V [CR] Stat Exams  21 14:38 Taken


 


 diphenhydrAMINE [Benadryl] Med  21 15:27 Once





 25 mg IM ONETIME ONE   











Meds: 


Medications














Discontinued Medications














Generic Name Dose Route Start Last Admin





  Trade Name Daniel  PRN Reason Stop Dose Admin


 


Benztropine Mesylate  1 mg  21 15:20 





  Cogentin  PO  21 15:21 





  ONETIME ONE  


 


Diphenhydramine HCl  25 mg  21 15:27 





  Benadryl  IM  21 15:28 





  ONETIME ONE  


 


Hydromorphone HCl  0.5 mg  21 13:20  21 13:31





  Dilaudid  IM  21 13:21  0.5 mg





  ONETIME ONE   Administration


 


Metoclopramide HCl  10 mg  21 14:37  21 14:54





  Reglan  IM  21 14:38  Not Given





  ONETIME ONE  


 


Ondansetron HCl  4 mg  21 13:20  21 13:30





  Zofran Odt  PO  21 13:21  4 mg





  ONETIME ONE   Administration


 


Promethazine HCl  25 mg  21 14:51  21 14:55





  Phenergan  IM  21 14:52  25 mg





  ONETIME ONE   Administration


 


Promethazine HCl  Confirm  21 14:53  21 14:59





  Phenergan  Administered  21 14:54  Not Given





  Dose  





  25 mg  





  .ROUTE  





  .[facility] ONE  














- Re-Assessments/Exams


Free Text/Narrative Re-Assessment/Exam: 





21 13:27


Patient presents to the ED for evaluation of her right foot/ankle injury.  Have 

ordered x-rays of her right foot and ankle, patient notes she has had a 

hysterectomy, so there is no chance that she could be pregnant.  She will get 

0.5mg IM Dilaudid and 4 mg p.o. Zofran for ongoing management.





21 14:29


The patient's foot x-rays and ankle x-rays are without acute fracture or 

abnormality.  We will get the patient home with Ace wrap, and crutches if she 

needs them and have her follow-up with her primary care or orthopedics if her 

foot is not getting much better down the road.





21 15:28


Patient was complaining of some ongoing back and hip pain after the foot x-rays.

  X-rays of her lumbar spine and hip and pelvis appear to be within normal 

limits.  Patient was increasingly nauseous, was given some IM Phenergan but is 

complaining of irritability or jitteriness, so I will order IM Benadryl.  We 

will discharge the patient home with above plan.





Departure





- Departure


Time of Disposition: 14:29


Disposition: Home, Self-Care 01


Condition: Good


Clinical Impression: 


Right foot sprain


Qualifiers:


 Encounter type: initial encounter Qualified Code(s): S93.601A - Unspecified 

sprain of right foot, initial encounter








- Discharge Information


*PRESCRIPTION DRUG MONITORING PROGRAM REVIEWED*: No


*COPY OF PRESCRIPTION DRUG MONITORING REPORT IN PATIENT CARLOS: No


Instructions:  Elastic Bandage and RICE Therapy


Forms:  ED Department Discharge


Additional Instructions: 


You have been evaluated in the ED for your right foot/ankle injury.





Your x-rays demonstrated no acute fracture or other bony abnormalities.  It does

 look like you have a pretty moderate sprain of the area.  Please use Ace wraps 

as needed for compression and pain management.





Please use ice as tolerated to the affected area.  Please try to elevate the 

affected area to relieve swelling.





You may take Tylenol 500 mg or ibuprofen 600mg q6 hrs for pain relief. Please do

 so until you have a tolerable level of pain with activity. Do not exceed 4000mg

 Tylenol or 3200mg ibuprofen in a 24 hour time period.





Commend follow-up by your regular care provider, or by orthopedics sometime 

within the next 7 to 10 days if things are not getting much better as expected.





Please return to ED if your symptoms should change or worsen.








Sepsis Event Note (ED)





- Evaluation


Sepsis Screening Result: No Definite Risk





- Focused Exam


Vital Signs: 


                                   Vital Signs











  Temp Pulse BP


 


 21 12:59  98.4 F  82  100/76














- My Orders


Last 24 Hours: 


My Active Orders





21 14:38


Hip Min 2V or 3V w Pelvis Rt [CR] Stat 


Lumbar Spine 2 or 3V [CR] Stat 





21 15:27


diphenhydrAMINE [Benadryl]   25 mg IM ONETIME ONE 














- Assessment/Plan


Last 24 Hours: 


My Active Orders





21 14:38


Hip Min 2V or 3V w Pelvis Rt [CR] Stat 


Lumbar Spine 2 or 3V [CR] Stat 





21 15:27


diphenhydrAMINE [Benadryl]   25 mg IM ONETIME ONE

## 2021-08-22 ENCOUNTER — HOSPITAL ENCOUNTER (EMERGENCY)
Dept: HOSPITAL 41 - JD.ED | Age: 31
Discharge: HOME | End: 2021-08-22
Payer: MEDICAID

## 2021-08-22 VITALS — SYSTOLIC BLOOD PRESSURE: 102 MMHG | DIASTOLIC BLOOD PRESSURE: 78 MMHG | HEART RATE: 72 BPM

## 2021-08-22 DIAGNOSIS — Z91.041: ICD-10-CM

## 2021-08-22 DIAGNOSIS — N39.0: Primary | ICD-10-CM

## 2021-08-22 DIAGNOSIS — Z88.5: ICD-10-CM

## 2021-08-22 DIAGNOSIS — Z79.899: ICD-10-CM

## 2021-08-22 PROCEDURE — 99284 EMERGENCY DEPT VISIT MOD MDM: CPT

## 2021-08-22 PROCEDURE — 96376 TX/PRO/DX INJ SAME DRUG ADON: CPT

## 2021-08-22 PROCEDURE — 74177 CT ABD & PELVIS W/CONTRAST: CPT

## 2021-08-22 PROCEDURE — 96375 TX/PRO/DX INJ NEW DRUG ADDON: CPT

## 2021-08-22 PROCEDURE — 80053 COMPREHEN METABOLIC PANEL: CPT

## 2021-08-22 PROCEDURE — 87086 URINE CULTURE/COLONY COUNT: CPT

## 2021-08-22 PROCEDURE — 81001 URINALYSIS AUTO W/SCOPE: CPT

## 2021-08-22 PROCEDURE — 36415 COLL VENOUS BLD VENIPUNCTURE: CPT

## 2021-08-22 PROCEDURE — 96365 THER/PROPH/DIAG IV INF INIT: CPT

## 2021-08-22 PROCEDURE — 83605 ASSAY OF LACTIC ACID: CPT

## 2021-08-22 PROCEDURE — 85025 COMPLETE CBC W/AUTO DIFF WBC: CPT

## 2021-08-22 RX ADMIN — FENTANYL CITRATE PRN MCG: 50 INJECTION, SOLUTION INTRAMUSCULAR; INTRAVENOUS at 08:49

## 2021-08-22 RX ADMIN — FENTANYL CITRATE PRN MCG: 50 INJECTION, SOLUTION INTRAMUSCULAR; INTRAVENOUS at 05:54

## 2021-08-22 NOTE — EDM.PDOC
ED HPI GENERAL MEDICAL PROBLEM





- General


Chief Complaint: OB/GYN Problem


Stated Complaint: ABDOMINAL/PELVIC PAIN


Time Seen by Provider: 21 05:04





- History of Present Illness


INITIAL COMMENTS - FREE TEXT/NARRATIVE: 





Patient arrived to ED by private vehicle


She is accompanied by her 





Reports onset of current symptoms Monday, 2021


Developed pain in the right lower abdomen


She thought it was recurrence of symptoms which she has had previously with 

chronic "bladder problems"


Endorses history of interstitial cystitis, endometriosis, and kidney stones


States she has had multiple surgeries associated with these conditions


On Thursday, 2021, she developed sharp, shooting pains which are different 

than those she has typically experienced


She awoke Saturday, 2021 with more severe pain which has gotten 

progressively worse


She has had minimal relief with tramadol, which is usually effective for her 

pain symptoms


Pain severity is currently rated 9/10


Pain is exacerbated by even minimal movements and changes in position, including

bumps in the road on the way to ED


Also has severe pain and burning with urination


Endorses nausea, without vomiting, along with subjective fever


Denies diarrhea or constipation





She has had hysterectomy and left oophorectomy


States she still has her appendix





  ** Vaginal


Pain Score (Numeric/FACES): 8





- Related Data


                                    Allergies











Allergy/AdvReac Type Severity Reaction Status Date / Time


 


codeine Allergy  Rash Verified 21 05:08


 


morphine Allergy  Hives Verified 21 05:08


 


ketorolac tromethamine AdvReac  Rash Verified 21 05:08





[From Toradol]     


 


metoclopramide HCl AdvReac  Irritabilit Verified 21 05:08





[From Reglan]   y  


 


MRI contrast Allergy  Airway Uncoded 21 13:01





   Tightness  











Home Meds: 


                                    Home Meds





LORazepam [Ativan] 1 mg PO BID PRN 18 [History]


Pentosan Polysulfate Sodium [Elmiron] 100 mg PO TID 18 [History]


Solifenacin [Vesicare] 10 mg PO DAILY 18 [History]


busPIRone [Buspar] 15 mg PO DAILY 18 [History]


Citalopram Hydrobromide [Celexa] 20 mg PO DAILY 18 [History]


Gabapentin [Neurontin] 100 mg PO BEDTIME 18 [History]


Topiramate 100 mg PO BID 18 [History]


traMADol [Ultram ER] 100 mg PO BID PRN 18 [History]


Baclofen 20 mg PO BID 01/10/20 [History]


Ciprofloxacin [Ciprofloxacin HCl] 500 mg PO Q12H #20 tab 21 [Rx]


Phenazopyridine [Pyridium] 100 mg PO TID PRN #8 tab 21 [Rx]











Past Medical History


HEENT History: Reports: Impaired Vision


Other HEENT History: glasses and contacts


Cardiovascular History: Reports: Syncope, Other (See Below)


Other Cardiovascular History: palpitations, irregular heartbeat due to stress.


Gastrointestinal History: Reports: Hemorrhoids


Other Gastrointestinal History: Rectal fissures. Diagnosis of irritable bowel 

syndrome intermittent constipation and diarrhea


Genitourinary History: Reports: Renal Calculus, Other (See Below)


Other Genitourinary History: endometreosis, kidney stones, reflux of urine into 

R) kidney. Recently diagnosed with interstitial cystitis of the urinary bladder.


OB/GYN History: Reports: Endometriosis, Pregnancy


Other OB/GYN History: left ovary removed and bowel surgery due to ovary embedded


Neurological History: Reports: Migraines, Other (See Below)


Other Neuro History: syncope


Psychiatric History: Reports: Anxiety, Depression


Hematologic History: Reports: Anemia


Oncologic (Cancer) History: Reports: Uterine


Dermatologic History: Reports: Eczema





- Past Surgical History


HEENT Surgical History: Reports: Oral Surgery


Other HEENT Surgeries/Procedures: wisdom teeth removed.


GI Surgical History: Reports: Lysis of Adhesions


Other GI Surgeries/Procedures: Rectal fissure surgery


Female  Surgical History: Reports:  Section, Hysterectomy, Kidney 

stone extraction, Oophorectomy, Ureteral Stent


Other Female  Surgeries/Procedures: Studies done on kidneys, bladder, and 

ureter.   States has had partial hyst.


Musculoskeletal Surgical History: Reports: Other (See Below)


Other Musculoskeletal Surgeries/Procedures:: dislocated lumbar disc





Social & Family History





- Family History


Family Medical History: No Pertinent Family History





- Tobacco Use


Tobacco Use Status *Q: Never Tobacco User





- Caffeine Use


Caffeine Use: Reports: None





- Recreational Drug Use


Recreational Drug Use: No





- Living Situation & Occupation


Living situation: Reports: , with Spouse, with Family (3 kids)


Occupation: Unemployed





ED ROS GENERAL





- Review of Systems


Review Of Systems: See Below


Free Text/Narrative/Comment: 





Constitutional - subjective fever


Eyes - no eye pain; no visual disturbance


ENT - no rhinorrhea; no congestion; no epistaxis


Cardiovascular - no chest pain


Respiratory - no shortness of breath; no cough


Gastrointestinal - abdominal pain; nausea; no vomiting; no diarrhea


Genitourinary - dysuria


Musculoskeletal - no neck pain; back pain; no extremity injury


Neurological - no headache; no speech disturbance; no weakness








ED EXAM, GENERAL





- Physical Exam


Exam: See Below


Free Text/Narrative:: 





Constitutional - awake; alert; moderate pain distress


Head - no facial swelling or weakness


Eyes - extra ocular motion intact; conjunctiva normal


ENT - no nasal deformity; no epistaxis; normal phonation


Neck - no swelling


Respiratory - normal respiratory effort; no crackles or wheezing; no stridor


Cardiovascular - regular rhythm; normal rate; S1; S2; grade 1/6 systolic murmur


GI/Abdomen - normal bowel sounds; soft; moderate to severe tenderness right 

lower quadrant, with mild to moderate generalized tenderness; rebound tenderness

 present; right CVA tenderness; positive psoas sign bilaterally, right worse 

than left; positive Rovsing sign; mild guarding; no mass


Musculoskeletal - grossly normal strength and motion; no swelling or deformity


Skin - warm; dry


Neurologic - normal speech; no weakness; gait intact


Psychiatric - normal mood and affect; memory and attention normal








Course





- Vital Signs


Text/Narrative:: 





.


Considered etiologies included:


Abdominal pain, pelvic pain, appendicitis, UTI, interstitial cystitis, ovarian 

cyst, chronic pain syndrome, kidney stone








Symptoms and examination were discussed


Investigations were initiated


Analgesic treatment was provided with fentanyl


Ondansetron was given for nausea


Diphenhydramine was given per patient request for contrast premedication


She states that in the past she has had anxious and itchy feeling after IV 

contrast, which has been prevented by Benadryl pretreatment





Lab and CT results were discussed, with findings primarily suggestive for UTI, 

no acute intra-abdominal pathology


Abnormalities on urinalysis were more significant than those in prior results


She was advised that exacerbation of interstitial cystitis not to be excluded


She was reminded that diagnosis or exclusion of UTI depends on culture results 

which would not be available for few days


Treatment for presumed UTI was initiated with IV ceftriaxone and phenazopyridine


She was given a second dose of fentanyl for pain


Primary care and/or urology follow-up was advised


Patient was felt to be stable for outpatient follow-up


Return precautions were provided








Last Recorded V/S: 


                                Last Vital Signs











Temp  37.7 C   21 05:06


 


Pulse  72   21 05:06


 


Resp  16   21 05:06


 


BP  102/78   21 05:06


 


Pulse Ox  99   21 05:06














- Orders/Labs/Meds


Orders: 


                               Active Orders 24 hr











 Category Date Time Status


 


 Peripheral IV Insertion Adult [OM.PC] Stat Oth  21 05:39 Ordered











Labs: 


                                Laboratory Tests











  21 Range/Units





  05:15 05:43 05:43 


 


WBC   7.70   (3.98-10.04)  K/mm3


 


RBC   3.99   (3.98-5.22)  M/mm3


 


Hgb   12.1   (11.2-15.7)  gm/dl


 


Hct   37.4   (34.1-44.9)  %


 


MCV   93.7   (79.4-94.8)  fl


 


MCH   30.3   (25.6-32.2)  pg


 


MCHC   32.4   (32.2-35.5)  g/dl


 


RDW Std Deviation   42.7   (36.4-46.3)  fL


 


Plt Count   232   (182-369)  K/mm3


 


MPV   10.3   (9.4-12.3)  fl


 


Neut % (Auto)   69.3   (34.0-71.1)  %


 


Lymph % (Auto)   18.6 L   (19.3-51.7)  %


 


Mono % (Auto)   8.2   (4.7-12.5)  %


 


Eos % (Auto)   3.2   (0.7-5.8)  


 


Baso % (Auto)   0.4   (0.1-1.2)  %


 


Neut # (Auto)   5.34   (1.56-6.13)  K/mm3


 


Lymph # (Auto)   1.43   (1.18-3.74)  K/mm3


 


Mono # (Auto)   0.63 H   (0.24-0.36)  K/mm3


 


Eos # (Auto)   0.25   (0.04-0.36)  K/mm3


 


Baso # (Auto)   0.03   (0.01-0.08)  K/mm3


 


Sodium    140  (136-145)  mEq/L


 


Potassium    3.8  (3.5-5.1)  mEq/L


 


Chloride    105  ()  mEq/L


 


Carbon Dioxide    24  (21-32)  mEq/L


 


Anion Gap    14.8  (5-15)  


 


BUN    12  (7-18)  mg/dL


 


Creatinine    1.1 H  (0.55-1.02)  mg/dL


 


Est Cr Clr Drug Dosing    60.17  mL/min


 


Estimated GFR (MDRD)    57  (>60)  mL/min


 


BUN/Creatinine Ratio    10.9 L  (14-18)  


 


Glucose    99  (70-99)  mg/dL


 


Lactic Acid     (0.4-2.0)  mmol/L


 


Calcium    7.9 L  (8.5-10.1)  mg/dL


 


Total Bilirubin    0.3  (0.2-1.0)  mg/dL


 


AST    11 L  (15-37)  U/L


 


ALT    17  (14-59)  U/L


 


Alkaline Phosphatase    42 L  ()  U/L


 


Total Protein    7.0  (6.4-8.2)  g/dl


 


Albumin    3.5  (3.4-5.0)  g/dl


 


Globulin    3.5  gm/dL


 


Albumin/Globulin Ratio    1.0  (1-2)  


 


Urine Color  Yellow    (Yellow)  


 


Urine Appearance  Cloudy H    (Clear)  


 


Urine pH  6.5    (5.0-8.0)  


 


Ur Specific Gravity  1.025    (1.005-1.030)  


 


Urine Protein  1+ H    (Negative)  


 


Urine Glucose (UA)  Negative    (Negative)  


 


Urine Ketones  Negative    (Negative)  


 


Urine Occult Blood  3+ H    (Negative)  


 


Urine Nitrite  Positive H    (Negative)  


 


Urine Bilirubin  Negative    (Negative)  


 


Urine Urobilinogen  0.2    (0.2-1.0)  


 


Ur Leukocyte Esterase  1+ H    (Negative)  


 


Urine RBC  50-75 H    (0-5)  /hpf


 


Urine WBC   H    (0-5)  /hpf


 


Urine WBC Clumps  Occasional    (NOT SEEN)  /hpf


 


Ur Squamous Epith Cells  5-10 H    (0-5)  /hpf


 


Urine Bacteria  Many H    (FEW)  /hpf


 


Urine Mucus  Not seen    (FEW)  /hpf














  21 Range/Units





  05:55 


 


WBC   (3.98-10.04)  K/mm3


 


RBC   (3.98-5.22)  M/mm3


 


Hgb   (11.2-15.7)  gm/dl


 


Hct   (34.1-44.9)  %


 


MCV   (79.4-94.8)  fl


 


MCH   (25.6-32.2)  pg


 


MCHC   (32.2-35.5)  g/dl


 


RDW Std Deviation   (36.4-46.3)  fL


 


Plt Count   (182-369)  K/mm3


 


MPV   (9.4-12.3)  fl


 


Neut % (Auto)   (34.0-71.1)  %


 


Lymph % (Auto)   (19.3-51.7)  %


 


Mono % (Auto)   (4.7-12.5)  %


 


Eos % (Auto)   (0.7-5.8)  


 


Baso % (Auto)   (0.1-1.2)  %


 


Neut # (Auto)   (1.56-6.13)  K/mm3


 


Lymph # (Auto)   (1.18-3.74)  K/mm3


 


Mono # (Auto)   (0.24-0.36)  K/mm3


 


Eos # (Auto)   (0.04-0.36)  K/mm3


 


Baso # (Auto)   (0.01-0.08)  K/mm3


 


Sodium   (136-145)  mEq/L


 


Potassium   (3.5-5.1)  mEq/L


 


Chloride   ()  mEq/L


 


Carbon Dioxide   (21-32)  mEq/L


 


Anion Gap   (5-15)  


 


BUN   (7-18)  mg/dL


 


Creatinine   (0.55-1.02)  mg/dL


 


Est Cr Clr Drug Dosing   mL/min


 


Estimated GFR (MDRD)   (>60)  mL/min


 


BUN/Creatinine Ratio   (14-18)  


 


Glucose   (70-99)  mg/dL


 


Lactic Acid  0.6  (0.4-2.0)  mmol/L


 


Calcium   (8.5-10.1)  mg/dL


 


Total Bilirubin   (0.2-1.0)  mg/dL


 


AST   (15-37)  U/L


 


ALT   (14-59)  U/L


 


Alkaline Phosphatase   ()  U/L


 


Total Protein   (6.4-8.2)  g/dl


 


Albumin   (3.4-5.0)  g/dl


 


Globulin   gm/dL


 


Albumin/Globulin Ratio   (1-2)  


 


Urine Color   (Yellow)  


 


Urine Appearance   (Clear)  


 


Urine pH   (5.0-8.0)  


 


Ur Specific Gravity   (1.005-1.030)  


 


Urine Protein   (Negative)  


 


Urine Glucose (UA)   (Negative)  


 


Urine Ketones   (Negative)  


 


Urine Occult Blood   (Negative)  


 


Urine Nitrite   (Negative)  


 


Urine Bilirubin   (Negative)  


 


Urine Urobilinogen   (0.2-1.0)  


 


Ur Leukocyte Esterase   (Negative)  


 


Urine RBC   (0-5)  /hpf


 


Urine WBC   (0-5)  /hpf


 


Urine WBC Clumps   (NOT SEEN)  /hpf


 


Ur Squamous Epith Cells   (0-5)  /hpf


 


Urine Bacteria   (FEW)  /hpf


 


Urine Mucus   (FEW)  /hpf











Meds: 


Medications














Discontinued Medications














Generic Name Dose Route Start Last Admin





  Trade Name Freq  PRN Reason Stop Dose Admin


 


Diatrizoate Meglum/Diatrizoate Sod  40 ml  21 07:05  21 07:19





  Diatrizoate Meglumine/Diatrizoate Sodium 37% 120 Ml Bottle  PO  21 07:06

  40 ml





  ONETIME ONE   Administration


 


Diphenhydramine HCl  25 mg  21 05:41  21 05:54





  Diphenhydramine 50 Mg/Ml Sdv  IVPUSH  21 05:42  25 mg





  ONETIME ONE   Administration


 


Diphenhydramine HCl  25 mg  21 09:18  21 09:26





  Diphenhydramine 50 Mg/Ml Sdv  IVPUSH  21 09:19  25 mg





  ONETIME ONE   Administration


 


Fentanyl  25 mcg  21 05:46  21 08:49





  Fentanyl 100 Mcg/2 Ml Sdv  IVPUSH   25 mcg





  Q15M PRN   Administration





  Pain  


 


Sodium Chloride  1,000 mls @ 200 mls/hr  21 05:45  21 05:54





  Normal Saline  IV   200 mls/hr





  ASDIRECTED BRIA   Administration


 


Ceftriaxone Sodium 1 gm/  100 mls @ 200 mls/hr  21 08:22  21 08:48





  Sodium Chloride  IV  21 08:51  200 mls/hr





  ONETIME ONE   Administration


 


Iopamidol  100 ml  21 07:05  21 07:19





  Iopamidol 612 Mg/Ml 100 Ml Bottle  IVPUSH  21 07:06  100 ml





  ONETIME ONE   Administration


 


Ondansetron HCl  4 mg  21 05:43  21 05:53





  Ondansetron 4 Mg/2 Ml Sdv  IVPUSH  21 05:44  4 mg





  ONETIME ONE   Administration


 


Phenazopyridine HCl  95 mg  21 09:00  21 08:50





  Phenazopyridine 95 Mg Tab  PO   95 mg





  TIDPC BRIA   Administration


 


Sodium Chloride  10 ml  21 05:39  21 05:53





  Sodium Chloride 0.9% 10 Ml Syringe  FLUSH   10 ml





  ASDIRECTED PRN   Administration





  Keep Vein Open  


 


Sodium Chloride  10 ml  21 07:05  21 07:20





  Sodium Chloride 0.9% 10 Ml Syringe  FLUSH   10 ml





  ONETIME PRN   Administration





  Keep Vein Open  














- Radiology Interpretation


Free Text/Narrative:: 





CT abdomen/pelvis, IV and oral contrast, radiology interpretation


1.  Right ureter is slightly prominent with mild enhancement of the right 

ureteral wall.  This may be incidental but please exclude any symptoms of urinar

y tract infection.  Incidental extrarenal pelvis is noted within the right 

kidney.


2.  Appendix is seen and appears to be normal


3.  Mild increased stool within the colon


4.  Two small liver lesions are seen and believed to be stable from prior CT 

exam  and therefore incidental








Departure





- Departure


Time of Disposition: 09:36


Disposition: Home, Self-Care 01


Clinical Impression: 


 Abdominal pain, UTI (urinary tract infection)








- Discharge Information


*PRESCRIPTION DRUG MONITORING PROGRAM REVIEWED*: Not Applicable


*COPY OF PRESCRIPTION DRUG MONITORING REPORT IN PATIENT CARLOS: Not Applicable


Prescriptions: 


Ciprofloxacin [Ciprofloxacin HCl] 500 mg PO Q12H #20 tab


Phenazopyridine [Pyridium] 100 mg PO TID PRN #8 tab


 PRN Reason: Dysuria


Instructions:  Pelvic Pain, Female, Urinary Tract Infection, Adult


Referrals: 


Santino Coppola MD [Primary Care Provider] - 


Forms:  ED Department Discharge


Additional Instructions: 


Return if condition worsens


May resume general activity and regular diet as tolerated


Continue usual medications


Take CIPROFLOXACIN antibiotic as prescribed, until completed


May take PHENAZOPYRIDINE as prescribed, as needed for urinary discomfort


Follow-up with primary care and/or urology provider is recommended within 5-7 

days








- My Orders


Last 24 Hours: 


My Active Orders





21 05:39


Peripheral IV Insertion Adult [OM.PC] Stat 














- Assessment/Plan


Last 24 Hours: 


My Active Orders





21 05:39


Peripheral IV Insertion Adult [OM.PC] Stat

## 2021-08-22 NOTE — CT
CT abdomen and pelvis

 

Technique: Multiple axial sections were obtained from slightly below 

the dome of the diaphragm inferiorly through the pubic symphysis.  

Intravenous and oral contrast were utilized.  Delayed images were also

 obtained from above the kidneys inferiorly through the pubic 

symphysis.  Reconstructed coronal and sagittal images were obtained.

 

Comparison: Prior noncontrast CT abdomen and pelvis study of 04/12/18 

and abdominal ultrasound of 07/17/18.

 

Findings: Visualized lung bases show nothing acute.  

 

Liver shows 2 small low density lesions.  These do not appear as cysts

 with largest nodule noted posteriorly within the right lobe of the 

liver measuring 1.1 cm and smaller abnormality more anteriorly within 

the right lobe measuring 4 mm.  These are felt to be without change 

from previous exam.

 

Spleen size is normal.  Adrenal glands show no nodule.  Pancreas shows

 no discrete abnormality.

 

Extrarenal pelvis is noted on the right side.  Right ureter is 

slightly prominent with mild enhancement of the right ureteral wall.  

No ureteral stone is seen.  Kidneys otherwise appear within normal 

limits.

 

Pancreas shows no discrete abnormality.  Gallbladder contains no 

calcified gallstones.  Abdominal aorta shows no aneurysm.  No 

retroperitoneal adenopathy or mesenteric abnormalities are seen.  

Appendix is seen which is normal in size.  No pelvic mass or 

adenopathy is seen.  No free fluid or inflammatory change is 

appreciated.

 

Delayed images show contrast excretion into both ureters as well as 

contrast noted within the bladder.

 

Mild increased stool is noted throughout the colon.  No small bowel 

dilatation is seen.

 

Bone window settings were reviewed.  Very minimal degenerative change 

is scattered within the spine.  No acute osseous abnormality is 

appreciated.

 

Impression:

1.  Right ureter is slightly prominent with mild enhancement of the 

right ureteral wall.  This may be incidental but please exclude any 

symptoms of urinary tract infection.  Incidental extrarenal pelvis is 

noted within the right kidney.

2.  Appendix is seen and appears to be normal.

3.  Mild increased stool within the colon.

4.  Two small liver lesions are seen and believed to be stable from 

prior CT exam of 2008 and therefore are incidental.

 

Diagnostic code #3

## 2021-12-26 ENCOUNTER — HOSPITAL ENCOUNTER (EMERGENCY)
Dept: HOSPITAL 41 - JD.ED | Age: 31
Discharge: HOME | End: 2021-12-26
Payer: MEDICAID

## 2021-12-26 VITALS — HEART RATE: 60 BPM | DIASTOLIC BLOOD PRESSURE: 97 MMHG | SYSTOLIC BLOOD PRESSURE: 124 MMHG

## 2021-12-26 DIAGNOSIS — Z91.041: ICD-10-CM

## 2021-12-26 DIAGNOSIS — Z88.8: ICD-10-CM

## 2021-12-26 DIAGNOSIS — Z79.899: ICD-10-CM

## 2021-12-26 DIAGNOSIS — Z88.5: ICD-10-CM

## 2021-12-26 DIAGNOSIS — N39.0: Primary | ICD-10-CM

## 2021-12-26 PROCEDURE — 96372 THER/PROPH/DIAG INJ SC/IM: CPT

## 2021-12-26 PROCEDURE — 81001 URINALYSIS AUTO W/SCOPE: CPT

## 2021-12-26 PROCEDURE — 99283 EMERGENCY DEPT VISIT LOW MDM: CPT

## 2021-12-26 PROCEDURE — 87086 URINE CULTURE/COLONY COUNT: CPT

## 2021-12-26 NOTE — EDM.PDOC
ED HPI GENERAL MEDICAL PROBLEM





- General


Chief Complaint: Genitourinary Problem


Stated Complaint: BLADDER ISSUES


Time Seen by Provider: 21 17:27


Source of Information: Reports: Patient, RN Notes Reviewed


History Limitations: Reports: No Limitations





- History of Present Illness


INITIAL COMMENTS - FREE TEXT/NARRATIVE: 





Patient is a 31-year-old female who presents to the ER for a suspected UTI.  

Patient states she has multiple bladder issues to include interstitial cystitis,

and GYN issues.  States that on , she developed some stabbing pain 

in her bladder area with some mild abdominal swelling.  States this continued 

through yesterday, and has worsened today.  She is taking her medications as 

prescribed for her bladder/GYN issues.  She is also taking some Pyridium along 

with heating pads to see if this would help but nothing seems to be doing much. 

No nausea no vomiting, no diarrhea, no fevers or chills, cough or shortness of 

breath associate with any of this.


  ** Vaginal


Pain Score (Numeric/FACES): 8





- Related Data


                                    Allergies











Allergy/AdvReac Type Severity Reaction Status Date / Time


 


codeine Allergy  Rash Verified 21 17:29


 


morphine Allergy  Hives Verified 21 17:29


 


ketorolac tromethamine AdvReac  Rash Verified 21 17:29





[From Toradol]     


 


metoclopramide HCl AdvReac  Irritabilit Verified 21 17:29





[From Reglan]   y  


 


MRI contrast Allergy  Airway Uncoded 21 13:01





   Tightness  











Home Meds: 


                                    Home Meds





LORazepam [Ativan] 1 mg PO BID PRN 18 [History]


Pentosan Polysulfate Sodium [Elmiron] 100 mg PO TID 18 [History]


Solifenacin [Vesicare] 10 mg PO DAILY 18 [History]


busPIRone [Buspar] 15 mg PO DAILY 18 [History]


Citalopram Hydrobromide [Celexa] 20 mg PO DAILY 18 [History]


Gabapentin [Neurontin] 100 mg PO BEDTIME 18 [History]


Topiramate 100 mg PO BID 18 [History]


traMADol [Ultram ER] 100 mg PO BID PRN 18 [History]


Baclofen 20 mg PO BID 01/10/20 [History]


Ciprofloxacin [Ciprofloxacin HCl] 500 mg PO Q12H #20 tab 21 [Rx]


Phenazopyridine [Pyridium] 100 mg PO TID PRN #8 tab 21 [Rx]


Cefdinir [Omnicef] 300 mg PO BID 5 Days #10 cap 21 [Rx]











Past Medical History


HEENT History: Reports: Impaired Vision


Other HEENT History: glasses and contacts


Cardiovascular History: Reports: Syncope, Other (See Below)


Other Cardiovascular History: palpitations, irregular heartbeat due to stress.


Gastrointestinal History: Reports: Hemorrhoids


Other Gastrointestinal History: Rectal fissures. Diagnosis of irritable bowel 

syndrome intermittent constipation and diarrhea


Genitourinary History: Reports: Renal Calculus, Other (See Below)


Other Genitourinary History: endometreosis, kidney stones, reflux of urine into 

R) kidney. Recently diagnosed with interstitial cystitis of the urinary bladder.


OB/GYN History: Reports: Endometriosis, Pregnancy


Other OB/GYN History: left ovary removed and bowel surgery due to ovary embedded


Neurological History: Reports: Migraines, Other (See Below)


Other Neuro History: syncope


Psychiatric History: Reports: Anxiety, Depression


Hematologic History: Reports: Anemia


Oncologic (Cancer) History: Reports: Uterine


Dermatologic History: Reports: Eczema





- Past Surgical History


HEENT Surgical History: Reports: Oral Surgery


Other HEENT Surgeries/Procedures: wisdom teeth removed.


GI Surgical History: Reports: Lysis of Adhesions


Other GI Surgeries/Procedures: Rectal fissure surgery


Female  Surgical History: Reports:  Section, Hysterectomy, Kidney 

stone extraction, Oophorectomy, Ureteral Stent


Other Female  Surgeries/Procedures: Studies done on kidneys, bladder, and 

ureter.   States has had partial hyst.


Musculoskeletal Surgical History: Reports: Other (See Below)


Other Musculoskeletal Surgeries/Procedures:: dislocated lumbar disc





Social & Family History





- Family History


Family Medical History: No Pertinent Family History





- Tobacco Use


Tobacco Use Status *Q: Never Tobacco User


Second Hand Smoke Exposure: No





- Caffeine Use


Caffeine Use: Reports: None





- Recreational Drug Use


Recreational Drug Use: No





- Living Situation & Occupation


Living situation: Reports: , with Spouse, with Family (3 kids)


Occupation: Unemployed





ED ROS GENERAL





- Review of Systems


Review Of Systems: Comprehensive ROS is negative, except as noted in HPI.





ED EXAM, RENAL/





- Physical Exam


Exam: See Below


Exam Limited By: No Limitations


General Appearance: Alert, WD/WN, No Apparent Distress


Respiratory/Chest: No Respiratory Distress, Lungs Clear, Normal Breath Sounds, 

No Accessory Muscle Use, Chest Non-Tender


Cardiovascular: Normal Peripheral Pulses, Regular Rate, Rhythm, No Edema


GI/Abdominal: Normal Bowel Sounds, Soft, No Distention, No Mass


Neurological: Alert, Oriented, Normal Cognition, No Motor/Sensory Deficits


Psychiatric: Normal Affect, Normal Mood


Skin Exam: Warm, Dry, Intact, Normal Color, No Rash





Course





- Vital Signs


Last Recorded V/S: 


                                Last Vital Signs











Temp  98.9 F   21 17:28


 


Pulse  60   21 17:28


 


Resp  18   21 17:28


 


BP  124/97 H  21 17:28


 


Pulse Ox  100   21 17:28














- Orders/Labs/Meds


Orders: 


                               Active Orders 24 hr











 Category Date Time Status


 


 CULTURE URINE [MREF] Stat Lab  21 17:29 Received


 


 UA W/MICROSCOPIC [URIN] Stat Lab  21 17:29 Results











Labs: 


                                Laboratory Tests











  21 Range/Units





  17:29 


 


Urine Color  Orange H  (Yellow)  


 


Urine Appearance  Slt cloudy H  (Clear)  


 


Urine pH  5.0  (5.0-8.0)  


 


Ur Specific Gravity  1.015  (1.005-1.030)  


 


Urine Protein  1+ H  (Negative)  


 


Urine Glucose (UA)  Trace H  (Negative)  


 


Urine Ketones  Trace H  (Negative)  


 


Urine Occult Blood  Negative  (Negative)  


 


Urine Nitrite  Positive H  (Negative)  


 


Urine Bilirubin  Negative  (Negative)  


 


Urine Urobilinogen  2.0 H  (0.2-1.0)  


 


Ur Leukocyte Esterase  Negative  (Negative)  











Meds: 


Medications














Discontinued Medications














Generic Name Dose Route Start Last Admin





  Trade Name Freq  PRN Reason Stop Dose Admin


 


Hydromorphone HCl  1 mg  21 17:38  21 18:30





  Hydromorphone 1 Mg/Ml Syringe  IM  21 17:39  1 mg





  ONETIME ONE   Administration














- Re-Assessments/Exams


Free Text/Narrative Re-Assessment/Exam: 





21 17:56


Patient presents to the ER for evaluation of her bladder issues.  We did get a 

urine at the time of triage.  Patient states she is having a decent amount of 

pain and did take her prescribed pain meds, tramadol at home and states it is 

not helping.  I will give her 1 mg IM Dilaudid for pain management.





21 18:48


I was made aware by nursing staff that the patient has been somewhat rude.  She 

was concerned that she was not getting IV medications.  I did have a 

conversation with her initially, due to her not having any sort of nausea or 

vomiting that her clinical course did not necessarily require IV or IM.  That 

she could likely take oral medication just fine.  It was reported to me that she

 told the nurse than hey, guess what "I am nauseous".  And then she stated that 

she did not want to see me as a provider.  Urinalysis does show nitrite positive

 with orange-colored cloudy urine but leukocyte Estrace negative, microscopy is 

still pending.  We will send the urine for culture but I do believe the nitrite 

is positive due to the Pyridium she has been taking.





21 18:56


Urine microscopy shows 5-10 WBC with moderate bacteria, this does not look like 

a dirty catch, will treat with omnicef





Departure





- Departure


Time of Disposition: 18:57


Disposition: Home, Self-Care 01


Condition: Good


Clinical Impression: 


 UTI, Urinary tract infectious disease








- Discharge Information


*PRESCRIPTION DRUG MONITORING PROGRAM REVIEWED*: No


*COPY OF PRESCRIPTION DRUG MONITORING REPORT IN PATIENT CARLOS: No


Instructions:  Urinary Tract Infection, Adult, Easy-to-Read


Referrals: 


Santino Coppola MD [Primary Care Provider] - 


Forms:  ED Department Discharge


Additional Instructions: 


You have been evaluated in the ED for your urinary symptoms.





Your urinalysis was consistent with an acute urinary tract infection. Your urine

was sent for culture, and you will be notified if you should need a change in 

your antibiotic. This may take up to 48 hours to result.





You may take AZO for urinary  pain relief. This is available over the counter, 

and can be attained at any retail store like [employer] or any pharmacy.  Please be

aware that this medication will make your urine turn orange. You should only use

this medication for a time period not longer than 72 hours.





You have been given a prescription for Omnicef (cefdinir), 300 mg 1 tablet 2 

times a day for 5 days.  Please note that the antibiotics can take up to 48 

hours to start working.  Your first dose was given in the ER.  This medication 

was electronically sent to the [facility] located near [facility].





Please increase your oral fluid intake and try to stay adequately hydrated.





Please return to the ED if your symptoms change or worsen.





Sepsis Event Note (ED)





- Focused Exam


Vital Signs: 


                                   Vital Signs











  Temp Pulse Resp BP Pulse Ox


 


 21 17:28  98.9 F  60  18  124/97 H  100














- My Orders


Last 24 Hours: 


My Active Orders





21 17:29


CULTURE URINE [MREF] Stat 


UA W/MICROSCOPIC [URIN] Stat 














- Assessment/Plan


Last 24 Hours: 


My Active Orders





21 17:29


CULTURE URINE [MREF] Stat 


UA W/MICROSCOPIC [URIN] Stat

## 2022-01-28 ENCOUNTER — HOSPITAL ENCOUNTER (EMERGENCY)
Dept: HOSPITAL 41 - JD.ED | Age: 32
Discharge: HOME | End: 2022-01-28
Payer: MEDICAID

## 2022-01-28 VITALS — SYSTOLIC BLOOD PRESSURE: 138 MMHG | DIASTOLIC BLOOD PRESSURE: 93 MMHG | HEART RATE: 89 BPM

## 2022-01-28 DIAGNOSIS — Z88.5: ICD-10-CM

## 2022-01-28 DIAGNOSIS — Z88.8: ICD-10-CM

## 2022-01-28 DIAGNOSIS — R55: Primary | ICD-10-CM

## 2022-01-28 DIAGNOSIS — Z91.041: ICD-10-CM

## 2022-10-11 ENCOUNTER — HOSPITAL ENCOUNTER (EMERGENCY)
Dept: HOSPITAL 41 - JD.ED | Age: 32
LOS: 1 days | Discharge: HOME | End: 2022-10-12
Payer: MEDICAID

## 2022-10-11 VITALS — SYSTOLIC BLOOD PRESSURE: 136 MMHG | DIASTOLIC BLOOD PRESSURE: 103 MMHG | HEART RATE: 85 BPM
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
## 2022-10-11 DIAGNOSIS — Z88.8: ICD-10-CM

## 2022-10-11 DIAGNOSIS — Z88.5: ICD-10-CM

## 2022-10-11 DIAGNOSIS — M54.50: Primary | ICD-10-CM

## 2022-10-11 PROCEDURE — 72100 X-RAY EXAM L-S SPINE 2/3 VWS: CPT

## 2022-10-11 PROCEDURE — 99283 EMERGENCY DEPT VISIT LOW MDM: CPT

## 2022-10-11 PROCEDURE — 96372 THER/PROPH/DIAG INJ SC/IM: CPT

## 2024-10-06 ENCOUNTER — HOSPITAL ENCOUNTER (EMERGENCY)
Dept: HOSPITAL 41 - JD.ED | Age: 34
Discharge: HOME | End: 2024-10-06
Payer: MEDICAID

## 2024-10-06 VITALS — DIASTOLIC BLOOD PRESSURE: 96 MMHG | HEART RATE: 83 BPM | SYSTOLIC BLOOD PRESSURE: 133 MMHG

## 2024-10-06 DIAGNOSIS — Z88.8: ICD-10-CM

## 2024-10-06 DIAGNOSIS — Z79.899: ICD-10-CM

## 2024-10-06 DIAGNOSIS — Z90.710: ICD-10-CM

## 2024-10-06 DIAGNOSIS — Z91.041: ICD-10-CM

## 2024-10-06 DIAGNOSIS — Z76.0: Primary | ICD-10-CM

## 2024-10-06 DIAGNOSIS — Z86.16: ICD-10-CM

## 2024-10-06 DIAGNOSIS — Z88.5: ICD-10-CM

## 2024-10-06 PROCEDURE — 99281 EMR DPT VST MAYX REQ PHY/QHP: CPT

## 2025-02-02 ENCOUNTER — HOSPITAL ENCOUNTER (EMERGENCY)
Dept: HOSPITAL 41 - JD.ED | Age: 35
LOS: 1 days | Discharge: HOME | End: 2025-02-03
Payer: MEDICAID

## 2025-02-02 VITALS — HEART RATE: 89 BPM | DIASTOLIC BLOOD PRESSURE: 97 MMHG | SYSTOLIC BLOOD PRESSURE: 137 MMHG

## 2025-02-02 DIAGNOSIS — Z86.16: ICD-10-CM

## 2025-02-02 DIAGNOSIS — Z88.5: ICD-10-CM

## 2025-02-02 DIAGNOSIS — Z79.899: ICD-10-CM

## 2025-02-02 DIAGNOSIS — N13.30: Primary | ICD-10-CM

## 2025-02-02 DIAGNOSIS — Z91.041: ICD-10-CM

## 2025-02-02 DIAGNOSIS — Z88.8: ICD-10-CM

## 2025-02-02 LAB
ALBUMIN SERPL-MCNC: 3.5 G/DL (ref 3.4–5)
ALBUMIN/GLOB SERPL: 1.1 {RATIO} (ref 1–2)
ALP SERPL-CCNC: 50 U/L (ref 46–116)
ALT SERPL-CCNC: 28 U/L (ref 14–59)
ANION GAP SERPL CALC-SCNC: 12.4 MMOL/L (ref 5–15)
APPEARANCE UR: CLEAR
AST SERPL-CCNC: 20 U/L (ref 15–37)
BASOPHILS # BLD AUTO: 0 K/MM3 (ref 0–0.2)
BASOPHILS NFR BLD AUTO: 0.8 % (ref 0–1)
BILIRUB SERPL-MCNC: 0.2 MG/DL (ref 0.2–1)
BILIRUB UR STRIP-MCNC: NEGATIVE MG/DL
BUN SERPL-MCNC: 12 MG/DL (ref 7–18)
BUN/CREAT SERPL: 12 (ref 14–18)
CALCIUM SERPL-MCNC: 8.1 MG/DL (ref 8.5–10.1)
CHLORIDE SERPL-SCNC: 106 MEQ/L (ref 98–107)
CO2 SERPL-SCNC: 26 MEQ/L (ref 21–32)
COLOR UR: (no result)
CREAT CL 24H UR+SERPL-VRATE: 64.33 ML/MIN
CREAT SERPL-MCNC: 1 MG/DL (ref 0.55–1.02)
EGFRCR SERPLBLD CKD-EPI 2021: 75 ML/MIN (ref 60–?)
EOSINOPHIL # BLD AUTO: 0.3 K/MM3 (ref 0–0.4)
EOSINOPHIL NFR BLD AUTO: 6.7 % (ref 0–6)
GLOBULIN SER-MCNC: 3.3 GM/DL
GLUCOSE SERPL-MCNC: 81 MG/DL (ref 70–99)
GLUCOSE UR STRIP-MCNC: NEGATIVE MG/DL
HCT VFR BLD AUTO: 35.7 % (ref 37–47)
HGB BLD-MCNC: 11.5 GM/DL (ref 12–16)
IMM GRANULOCYTES # BLD: 0 K/MM3 (ref 0–0.05)
IMM GRANULOCYTES NFR BLD: 0 % (ref 0–0.4)
KETONES UR STRIP-MCNC: NEGATIVE MG/DL
LACTATE SERPL-SCNC: 0.7 MMOL/L (ref 0.4–2)
LIPASE SERPL-CCNC: 31 U/L (ref 16–77)
LYMPHOCYTES # BLD AUTO: 1.8 K/MM3 (ref 1–4.8)
LYMPHOCYTES NFR BLD AUTO: 35.9 % (ref 24–44)
MCH RBC QN AUTO: 29.6 PG (ref 28–32)
MCHC RBC AUTO-ENTMCNC: 32.2 G/DL (ref 32–36)
MCHC RBC AUTO-ENTMCNC: 92 FL (ref 83–99)
MONOCYTES # BLD AUTO: 0.4 K/MM3 (ref 0–0.8)
MONOCYTES NFR BLD AUTO: 7.5 % (ref 0–8)
NEUTROPHILS # BLD AUTO: 2.4 K/MM3 (ref 1.8–7.7)
NEUTROPHILS NFR BLD AUTO: 49.1 % (ref 41–71)
NITRITE UR QL: NEGATIVE
NRBC BLD AUTO-RTO: 0 % (ref 0–0.02)
NRBC BLD AUTO-RTO: 0 % (ref 0–0.2)
PH UR STRIP: 7 [PH] (ref 5–8)
PLATELET # BLD AUTO: 178 K/MM3 (ref 150–400)
PMV BLD AUTO: 10.6 FL (ref 9.4–12.3)
POTASSIUM SERPL-SCNC: 3.4 MEQ/L (ref 3.5–5.1)
PROT SERPL-MCNC: 6.8 G/DL (ref 6.4–8.2)
PROT UR STRIP-MCNC: NEGATIVE MG/DL
RBC # BLD AUTO: 3.88 M/MM3 (ref 4.1–5.3)
RBC UR QL: NEGATIVE
SODIUM SERPL-SCNC: 141 MEQ/L (ref 136–145)
SP GR UR STRIP: 1.01 (ref 1–1.03)
UROBILINOGEN UR STRIP-ACNC: 0.2 (ref 0.2–1)
WBC # BLD AUTO: 4.96 K/MM3 (ref 3.9–11.3)

## 2025-02-02 PROCEDURE — 96375 TX/PRO/DX INJ NEW DRUG ADDON: CPT

## 2025-02-02 PROCEDURE — 85025 COMPLETE CBC W/AUTO DIFF WBC: CPT

## 2025-02-02 PROCEDURE — 99284 EMERGENCY DEPT VISIT MOD MDM: CPT

## 2025-02-02 PROCEDURE — 36415 COLL VENOUS BLD VENIPUNCTURE: CPT

## 2025-02-02 PROCEDURE — 80053 COMPREHEN METABOLIC PANEL: CPT

## 2025-02-02 PROCEDURE — 81003 URINALYSIS AUTO W/O SCOPE: CPT

## 2025-02-02 PROCEDURE — 84703 CHORIONIC GONADOTROPIN ASSAY: CPT

## 2025-02-02 PROCEDURE — 83690 ASSAY OF LIPASE: CPT

## 2025-02-02 PROCEDURE — 96374 THER/PROPH/DIAG INJ IV PUSH: CPT

## 2025-02-02 PROCEDURE — 96376 TX/PRO/DX INJ SAME DRUG ADON: CPT

## 2025-02-02 PROCEDURE — 87086 URINE CULTURE/COLONY COUNT: CPT

## 2025-02-02 PROCEDURE — 74177 CT ABD & PELVIS W/CONTRAST: CPT

## 2025-02-02 PROCEDURE — 76857 US EXAM PELVIC LIMITED: CPT

## 2025-02-02 PROCEDURE — 83605 ASSAY OF LACTIC ACID: CPT

## 2025-02-02 RX ADMIN — IOPAMIDOL ONE ML: 612 INJECTION, SOLUTION INTRAVENOUS at 23:07

## 2025-02-02 RX ADMIN — Medication PRN ML: at 23:07

## 2025-02-02 RX ADMIN — DIPHENHYDRAMINE HYDROCHLORIDE ONE MG: 50 INJECTION, SOLUTION INTRAMUSCULAR; INTRAVENOUS at 22:55

## 2025-02-02 RX ADMIN — SODIUM CHLORIDE ONE MG: 9 INJECTION, SOLUTION INTRAVENOUS at 22:55

## 2025-02-03 RX ADMIN — DIPHENHYDRAMINE HYDROCHLORIDE ONE MG: 50 INJECTION, SOLUTION INTRAMUSCULAR; INTRAVENOUS at 00:56
